# Patient Record
Sex: FEMALE | Race: WHITE | Employment: OTHER | URBAN - NONMETROPOLITAN AREA
[De-identification: names, ages, dates, MRNs, and addresses within clinical notes are randomized per-mention and may not be internally consistent; named-entity substitution may affect disease eponyms.]

---

## 2017-07-26 ENCOUNTER — HOSPITAL ENCOUNTER (OUTPATIENT)
Dept: WOMENS IMAGING | Age: 77
Discharge: HOME | End: 2017-07-26
Payer: MEDICARE

## 2017-07-26 DIAGNOSIS — N64.4 MASTODYNIA: ICD-10-CM

## 2017-07-26 DIAGNOSIS — N64.4 BREAST PAIN: ICD-10-CM

## 2017-07-26 PROCEDURE — 76642 ULTRASOUND BREAST LIMITED: CPT

## 2017-07-26 PROCEDURE — G0279 TOMOSYNTHESIS, MAMMO: HCPCS

## 2018-07-27 ENCOUNTER — HOSPITAL ENCOUNTER (OUTPATIENT)
Dept: WOMENS IMAGING | Age: 78
Discharge: HOME | End: 2018-07-27
Payer: MEDICARE

## 2018-07-27 DIAGNOSIS — Z12.31 VISIT FOR SCREENING MAMMOGRAM: ICD-10-CM

## 2018-07-27 PROCEDURE — 77067 SCR MAMMO BI INCL CAD: CPT

## 2018-12-06 ENCOUNTER — APPOINTMENT (OUTPATIENT)
Dept: GENERAL RADIOLOGY | Age: 78
End: 2018-12-06
Payer: MEDICARE

## 2018-12-06 ENCOUNTER — APPOINTMENT (OUTPATIENT)
Dept: CT IMAGING | Age: 78
End: 2018-12-06
Payer: MEDICARE

## 2018-12-06 ENCOUNTER — HOSPITAL ENCOUNTER (OUTPATIENT)
Age: 78
Setting detail: OBSERVATION
Discharge: HOME | End: 2018-12-07
Attending: EMERGENCY MEDICINE | Admitting: INTERNAL MEDICINE
Payer: MEDICARE

## 2018-12-06 DIAGNOSIS — I10 HYPERTENSION, UNSPECIFIED TYPE: ICD-10-CM

## 2018-12-06 DIAGNOSIS — R20.2 FACIAL TINGLING: Primary | ICD-10-CM

## 2018-12-06 DIAGNOSIS — G45.9 TIA (TRANSIENT ISCHEMIC ATTACK): ICD-10-CM

## 2018-12-06 PROBLEM — G43.109 MIGRAINE WITH TYPICAL AURA: Status: ACTIVE | Noted: 2018-12-06

## 2018-12-06 LAB
ALBUMIN SERPL-MCNC: 3.7 G/DL (ref 3.5–5.1)
ALP BLD-CCNC: 62 U/L (ref 38–126)
ALT SERPL-CCNC: 13 U/L (ref 11–66)
ANION GAP SERPL CALCULATED.3IONS-SCNC: 12 MEQ/L (ref 8–16)
APTT: 28.2 SECONDS (ref 22–38)
AST SERPL-CCNC: 16 U/L (ref 5–40)
BASOPHILS # BLD: 0.5 %
BASOPHILS ABSOLUTE: 0 THOU/MM3 (ref 0–0.1)
BILIRUB SERPL-MCNC: 0.3 MG/DL (ref 0.3–1.2)
BILIRUBIN DIRECT: < 0.2 MG/DL (ref 0–0.3)
BUN BLDV-MCNC: 22 MG/DL (ref 7–22)
CALCIUM SERPL-MCNC: 9.3 MG/DL (ref 8.5–10.5)
CHLORIDE BLD-SCNC: 102 MEQ/L (ref 98–111)
CO2: 24 MEQ/L (ref 23–33)
CREAT SERPL-MCNC: 1.1 MG/DL (ref 0.4–1.2)
EKG ATRIAL RATE: 92 BPM
EKG P AXIS: 53 DEGREES
EKG P-R INTERVAL: 222 MS
EKG Q-T INTERVAL: 382 MS
EKG QRS DURATION: 146 MS
EKG QTC CALCULATION (BAZETT): 472 MS
EKG R AXIS: -70 DEGREES
EKG T AXIS: 98 DEGREES
EKG VENTRICULAR RATE: 92 BPM
EOSINOPHIL # BLD: 2.2 %
EOSINOPHILS ABSOLUTE: 0.1 THOU/MM3 (ref 0–0.4)
ERYTHROCYTE [DISTWIDTH] IN BLOOD BY AUTOMATED COUNT: 12.7 % (ref 11.5–14.5)
ERYTHROCYTE [DISTWIDTH] IN BLOOD BY AUTOMATED COUNT: 41.5 FL (ref 35–45)
GFR SERPL CREATININE-BSD FRML MDRD: 48 ML/MIN/1.73M2
GLUCOSE BLD-MCNC: 124 MG/DL (ref 70–108)
GLUCOSE BLD-MCNC: 127 MG/DL (ref 70–108)
HCT VFR BLD CALC: 45.6 % (ref 37–47)
HEMOGLOBIN: 15.1 GM/DL (ref 12–16)
IMMATURE GRANS (ABS): 0.02 THOU/MM3 (ref 0–0.07)
IMMATURE GRANULOCYTES: 0.3 %
INR BLD: 0.9 (ref 0.85–1.13)
LIPASE: 30.5 U/L (ref 5.6–51.3)
LYMPHOCYTES # BLD: 16.2 %
LYMPHOCYTES ABSOLUTE: 1 THOU/MM3 (ref 1–4.8)
MAGNESIUM: 2.2 MG/DL (ref 1.6–2.4)
MCH RBC QN AUTO: 30 PG (ref 26–33)
MCHC RBC AUTO-ENTMCNC: 33.1 GM/DL (ref 32.2–35.5)
MCV RBC AUTO: 90.5 FL (ref 81–99)
MONOCYTES # BLD: 7.3 %
MONOCYTES ABSOLUTE: 0.5 THOU/MM3 (ref 0.4–1.3)
NUCLEATED RED BLOOD CELLS: 0 /100 WBC
OSMOLALITY CALCULATION: 280.4 MOSMOL/KG (ref 275–300)
PLATELET # BLD: 196 THOU/MM3 (ref 130–400)
PMV BLD AUTO: 10.6 FL (ref 9.4–12.4)
POTASSIUM SERPL-SCNC: 4.6 MEQ/L (ref 3.5–5.2)
RBC # BLD: 5.04 MILL/MM3 (ref 4.2–5.4)
SEG NEUTROPHILS: 73.5 %
SEGMENTED NEUTROPHILS ABSOLUTE COUNT: 4.6 THOU/MM3 (ref 1.8–7.7)
SODIUM BLD-SCNC: 138 MEQ/L (ref 135–145)
TOTAL PROTEIN: 6.9 G/DL (ref 6.1–8)
TROPONIN T: < 0.01 NG/ML
WBC # BLD: 6.3 THOU/MM3 (ref 4.8–10.8)

## 2018-12-06 PROCEDURE — 85610 PROTHROMBIN TIME: CPT

## 2018-12-06 PROCEDURE — 99220 PR INITIAL OBSERVATION CARE/DAY 70 MINUTES: CPT | Performed by: INTERNAL MEDICINE

## 2018-12-06 PROCEDURE — 71045 X-RAY EXAM CHEST 1 VIEW: CPT

## 2018-12-06 PROCEDURE — 85025 COMPLETE CBC W/AUTO DIFF WBC: CPT

## 2018-12-06 PROCEDURE — 83690 ASSAY OF LIPASE: CPT

## 2018-12-06 PROCEDURE — 83735 ASSAY OF MAGNESIUM: CPT

## 2018-12-06 PROCEDURE — 82948 REAGENT STRIP/BLOOD GLUCOSE: CPT

## 2018-12-06 PROCEDURE — 82248 BILIRUBIN DIRECT: CPT

## 2018-12-06 PROCEDURE — 80053 COMPREHEN METABOLIC PANEL: CPT

## 2018-12-06 PROCEDURE — 85730 THROMBOPLASTIN TIME PARTIAL: CPT

## 2018-12-06 PROCEDURE — 36415 COLL VENOUS BLD VENIPUNCTURE: CPT

## 2018-12-06 PROCEDURE — 99285 EMERGENCY DEPT VISIT HI MDM: CPT

## 2018-12-06 PROCEDURE — 6370000000 HC RX 637 (ALT 250 FOR IP): Performed by: NURSE PRACTITIONER

## 2018-12-06 PROCEDURE — 70450 CT HEAD/BRAIN W/O DYE: CPT

## 2018-12-06 PROCEDURE — 84484 ASSAY OF TROPONIN QUANT: CPT

## 2018-12-06 PROCEDURE — 93005 ELECTROCARDIOGRAM TRACING: CPT | Performed by: NURSE PRACTITIONER

## 2018-12-06 RX ORDER — CLONIDINE HYDROCHLORIDE 0.1 MG/1
0.1 TABLET ORAL ONCE
Status: COMPLETED | OUTPATIENT
Start: 2018-12-06 | End: 2018-12-06

## 2018-12-06 RX ORDER — CLOPIDOGREL 300 MG/1
300 TABLET, FILM COATED ORAL ONCE
Status: COMPLETED | OUTPATIENT
Start: 2018-12-06 | End: 2018-12-06

## 2018-12-06 RX ADMIN — CLOPIDOGREL BISULFATE 300 MG: 300 TABLET, FILM COATED ORAL at 22:32

## 2018-12-06 RX ADMIN — CLONIDINE HYDROCHLORIDE 0.1 MG: 0.1 TABLET ORAL at 22:32

## 2018-12-06 ASSESSMENT — ENCOUNTER SYMPTOMS: SHORTNESS OF BREATH: 0

## 2018-12-07 VITALS
HEART RATE: 67 BPM | RESPIRATION RATE: 16 BRPM | OXYGEN SATURATION: 93 % | DIASTOLIC BLOOD PRESSURE: 70 MMHG | SYSTOLIC BLOOD PRESSURE: 160 MMHG | HEIGHT: 63 IN | TEMPERATURE: 97.9 F | BODY MASS INDEX: 27.46 KG/M2 | WEIGHT: 155 LBS

## 2018-12-07 PROBLEM — G45.9 TIA (TRANSIENT ISCHEMIC ATTACK): Status: ACTIVE | Noted: 2018-12-07

## 2018-12-07 PROBLEM — G45.9 TIA (TRANSIENT ISCHEMIC ATTACK): Status: RESOLVED | Noted: 2018-12-07 | Resolved: 2018-12-07

## 2018-12-07 PROBLEM — G43.109 MIGRAINE WITH TYPICAL AURA: Status: RESOLVED | Noted: 2018-12-06 | Resolved: 2018-12-07

## 2018-12-07 LAB
ANION GAP SERPL CALCULATED.3IONS-SCNC: 11 MEQ/L (ref 8–16)
BUN BLDV-MCNC: 19 MG/DL (ref 7–22)
CALCIUM SERPL-MCNC: 9.3 MG/DL (ref 8.5–10.5)
CHLORIDE BLD-SCNC: 106 MEQ/L (ref 98–111)
CHOLESTEROL, TOTAL: 166 MG/DL (ref 100–199)
CO2: 24 MEQ/L (ref 23–33)
CREAT SERPL-MCNC: 1.1 MG/DL (ref 0.4–1.2)
ERYTHROCYTE [DISTWIDTH] IN BLOOD BY AUTOMATED COUNT: 12.5 % (ref 11.5–14.5)
ERYTHROCYTE [DISTWIDTH] IN BLOOD BY AUTOMATED COUNT: 43 FL (ref 35–45)
GFR SERPL CREATININE-BSD FRML MDRD: 48 ML/MIN/1.73M2
GLUCOSE BLD-MCNC: 109 MG/DL (ref 70–108)
HCT VFR BLD CALC: 44.3 % (ref 37–47)
HDLC SERPL-MCNC: 42 MG/DL
HEMOGLOBIN: 14.2 GM/DL (ref 12–16)
LDL CHOLESTEROL CALCULATED: 102 MG/DL
LV EF: 55 %
LVEF MODALITY: NORMAL
MCH RBC QN AUTO: 30 PG (ref 26–33)
MCHC RBC AUTO-ENTMCNC: 32.1 GM/DL (ref 32.2–35.5)
MCV RBC AUTO: 93.7 FL (ref 81–99)
OSMOLALITY CALCULATION: 284.1 MOSMOL/KG (ref 275–300)
PLATELET # BLD: 176 THOU/MM3 (ref 130–400)
PMV BLD AUTO: 10 FL (ref 9.4–12.4)
POTASSIUM SERPL-SCNC: 4.2 MEQ/L (ref 3.5–5.2)
RBC # BLD: 4.73 MILL/MM3 (ref 4.2–5.4)
SEDIMENTATION RATE, ERYTHROCYTE: 18 MM/HR (ref 0–20)
SODIUM BLD-SCNC: 141 MEQ/L (ref 135–145)
TRIGL SERPL-MCNC: 109 MG/DL (ref 0–199)
WBC # BLD: 6.4 THOU/MM3 (ref 4.8–10.8)

## 2018-12-07 PROCEDURE — G0378 HOSPITAL OBSERVATION PER HR: HCPCS

## 2018-12-07 PROCEDURE — 36415 COLL VENOUS BLD VENIPUNCTURE: CPT

## 2018-12-07 PROCEDURE — 6360000002 HC RX W HCPCS: Performed by: INTERNAL MEDICINE

## 2018-12-07 PROCEDURE — 80048 BASIC METABOLIC PNL TOTAL CA: CPT

## 2018-12-07 PROCEDURE — 80061 LIPID PANEL: CPT

## 2018-12-07 PROCEDURE — 93306 TTE W/DOPPLER COMPLETE: CPT

## 2018-12-07 PROCEDURE — 93010 ELECTROCARDIOGRAM REPORT: CPT | Performed by: NUCLEAR MEDICINE

## 2018-12-07 PROCEDURE — 85027 COMPLETE CBC AUTOMATED: CPT

## 2018-12-07 PROCEDURE — 85651 RBC SED RATE NONAUTOMATED: CPT

## 2018-12-07 PROCEDURE — 96374 THER/PROPH/DIAG INJ IV PUSH: CPT

## 2018-12-07 PROCEDURE — 6370000000 HC RX 637 (ALT 250 FOR IP): Performed by: INTERNAL MEDICINE

## 2018-12-07 PROCEDURE — 99217 PR OBSERVATION CARE DISCHARGE MANAGEMENT: CPT | Performed by: INTERNAL MEDICINE

## 2018-12-07 RX ORDER — ISOSORBIDE MONONITRATE 30 MG/1
30 TABLET, EXTENDED RELEASE ORAL DAILY
Status: DISCONTINUED | OUTPATIENT
Start: 2018-12-07 | End: 2018-12-07 | Stop reason: HOSPADM

## 2018-12-07 RX ORDER — NITROGLYCERIN 0.4 MG/1
0.4 TABLET SUBLINGUAL EVERY 5 MIN PRN
Status: DISCONTINUED | OUTPATIENT
Start: 2018-12-07 | End: 2018-12-07 | Stop reason: HOSPADM

## 2018-12-07 RX ORDER — ISOSORBIDE MONONITRATE 30 MG/1
30 TABLET, EXTENDED RELEASE ORAL DAILY
Qty: 30 TABLET | Refills: 3 | Status: ON HOLD | OUTPATIENT
Start: 2018-12-07 | End: 2019-02-24 | Stop reason: HOSPADM

## 2018-12-07 RX ORDER — HYDRALAZINE HYDROCHLORIDE 20 MG/ML
5 INJECTION INTRAMUSCULAR; INTRAVENOUS ONCE
Status: COMPLETED | OUTPATIENT
Start: 2018-12-07 | End: 2018-12-07

## 2018-12-07 RX ORDER — CLONIDINE HYDROCHLORIDE 0.1 MG/1
0.1 TABLET ORAL DAILY
Qty: 60 TABLET | Refills: 3 | Status: SHIPPED | OUTPATIENT
Start: 2018-12-07 | End: 2022-11-01 | Stop reason: SDUPTHER

## 2018-12-07 RX ORDER — CLONIDINE HYDROCHLORIDE 0.1 MG/1
0.1 TABLET ORAL NIGHTLY
Status: DISCONTINUED | OUTPATIENT
Start: 2018-12-07 | End: 2018-12-07 | Stop reason: HOSPADM

## 2018-12-07 RX ORDER — ASPIRIN 81 MG/1
81 TABLET ORAL DAILY
Status: DISCONTINUED | OUTPATIENT
Start: 2018-12-07 | End: 2018-12-07 | Stop reason: HOSPADM

## 2018-12-07 RX ORDER — METOPROLOL SUCCINATE 25 MG/1
25 TABLET, EXTENDED RELEASE ORAL DAILY
Status: DISCONTINUED | OUTPATIENT
Start: 2018-12-07 | End: 2018-12-07 | Stop reason: HOSPADM

## 2018-12-07 RX ORDER — LOSARTAN POTASSIUM 50 MG/1
50 TABLET ORAL DAILY
Status: DISCONTINUED | OUTPATIENT
Start: 2018-12-07 | End: 2018-12-07 | Stop reason: HOSPADM

## 2018-12-07 RX ORDER — ACETAMINOPHEN 325 MG/1
650 TABLET ORAL EVERY 6 HOURS PRN
Status: DISCONTINUED | OUTPATIENT
Start: 2018-12-07 | End: 2018-12-07 | Stop reason: HOSPADM

## 2018-12-07 RX ORDER — ASCORBIC ACID 500 MG
500 TABLET ORAL DAILY
Status: DISCONTINUED | OUTPATIENT
Start: 2018-12-07 | End: 2018-12-07 | Stop reason: HOSPADM

## 2018-12-07 RX ORDER — EZETIMIBE 10 MG/1
10 TABLET ORAL NIGHTLY
Status: DISCONTINUED | OUTPATIENT
Start: 2018-12-07 | End: 2018-12-07

## 2018-12-07 RX ADMIN — HYDRALAZINE HYDROCHLORIDE 5 MG: 20 INJECTION INTRAMUSCULAR; INTRAVENOUS at 03:48

## 2018-12-07 RX ADMIN — LOSARTAN POTASSIUM 50 MG: 50 TABLET, FILM COATED ORAL at 09:51

## 2018-12-07 RX ADMIN — METOPROLOL SUCCINATE 25 MG: 25 TABLET, EXTENDED RELEASE ORAL at 09:52

## 2018-12-07 RX ADMIN — ISOSORBIDE MONONITRATE 30 MG: 30 TABLET, EXTENDED RELEASE ORAL at 09:51

## 2018-12-07 RX ADMIN — ACETAMINOPHEN 650 MG: 325 TABLET ORAL at 07:52

## 2018-12-07 RX ADMIN — ASPIRIN 81 MG: 81 TABLET ORAL at 09:50

## 2018-12-07 ASSESSMENT — PAIN SCALES - GENERAL
PAINLEVEL_OUTOF10: 6
PAINLEVEL_OUTOF10: 0

## 2018-12-07 NOTE — ED NOTES
Pt states that she is no longer experiencing any symptoms, says that she no longer has a strange sensation on the left side of her face     Ania Franco RN  12/06/18 [id number]

## 2018-12-07 NOTE — H&P
History & Physical        Patient:  Xiomara Cha  YOB: 1940    MRN: [id number]     Acct: [de-identified]    PCP: Giovanny Rolon MD    Date of Admission: 12/6/2018    Date of Service: Pt seen/examined on 12/06/18  and Admitted to Inpatient with expected LOS less than two midnights due to medical therapy. Chief Complaint:  Left sided facial numbness    History Of Present Illness:   [de-identified] y.o. female who presented to [facility] with left sided facial numbness since 5 pm, patient also reports long standing hx of sinus headaches but does not remember when was the last time she was treated with nasonex of antibiotics. Patient reports some scalp headache over the left sided without visual changes. Denies fever, neck rigidity, nausea or vomiting, hx of vertigo, syncope, no fmhx of vasculitis, avms or aneurysms. Past Medical History:          Diagnosis Date    Arthritis     CAD (coronary artery disease)     Hyperlipidemia     Hypertension     Ovarian cyst     right       Past Surgical History:          Procedure Laterality Date    CARDIAC CATHETERIZATION  2007    CHOLECYSTECTOMY  1986    COLONOSCOPY      last one 2014   [address]    FOOT SURGERY Right 2004 & 2005    x2    FOOT SURGERY Left 6/24/2015    Osteotomy, Tendon Transfer    HYSTERECTOMY  1981    OVARY REMOVAL Bilateral 3/23/13    PACEMAKER PLACEMENT  2004    PARTIAL HYSTERECTOMY  1981    still has ovaries    SEPTOPLASTY  9/28/2011    Dr. Lawton Numbers      dr Vu Pierson [id number]    TONSILLECTOMY      as a child   Hoda Costa  9/28/2011    Dr. Jose Garsia       Medications Prior to Admission:      Prior to Admission medications    Medication Sig Start Date End Date Taking?  Authorizing Provider   Cholecalciferol (VITAMIN D3) 2000 UNITS CAPS Take 1 capsule by mouth daily   Yes Historical Provider, MD   cloNIDine (CATAPRES) 0.1 MG tablet Take 0.1 mg by mouth nightly   Yes Edyta Mckenzie on 12/6/2018 9:12 PM        DVT prophylaxis: [] Lovenox                                 [x] SCDs                                 [] SQ Heparin                                 [] Encourage ambulation           [] Already on Anticoagulation    Code Status: FULL CODE  Disposition:    [x] Home       [] TCU       [] Rehab       [] Psych       [] SNF       [] [place]       [] Other-    ASSESSMENT and PLAN:    1. Left sided facial numbness  2. Migraine with aura vs TIA  3. Telemetry, TTE.   4. Patient declined getting an MRI and or MRV multiple times to me  5. Patient not a candidate for Tpa: Age and uncontrolled BP  6. Patient declines using statins, and did not want revascularization surgery, much less thrombectomy for this reasons I will hold off further work up since it would not . 7. I will leave to am attending need for Neurology. 8. Continue home meds        Thank you Henry Abernathy MD for the opportunity to be involved in this patient's care.     Electronically signed by Natalia Martin MD on 12/6/2018 at 10:51 PM

## 2018-12-07 NOTE — PROGRESS NOTES
Dr. Jyoti Gamez stated okay to discharge pt at this time. Per physician, no need for neurology consult at this time. Pt updated.

## 2018-12-07 NOTE — PLAN OF CARE
Problem: Falls - Risk of:  Goal: Will remain free from falls  Will remain free from falls   Outcome: Ongoing  Bed in lowest position and locked. Bed alarm activated. Educated on use of call light when in need of assistance- expressed understanding. Visual checks performed and charted. No falls during shift at this time. Armband and falling star in place. Call light within reach. Transfers with one assist.      Problem: Discharge Planning:  Goal: Participates in care planning  Participates in care planning  Outcome: Not Met This Shift  Patient is from home alone. Plans to return home at discharge. Patient is observation at this time. Problem: Pain:  Goal: Pain level will decrease  Pain level will decrease  Outcome: Ongoing  Patient has denied pain so far this shift. Comments: Care plan reviewed with patient and sister. Patient and sister verbalize understanding of the plan of care and contribute to goal setting.
and contributes to goal setting.

## 2018-12-07 NOTE — ED NOTES
Pt symptoms remain the same, pt alert and oriented, denies any other symptoms, provider into room     Kerrie Boucher RN  12/06/18 2810

## 2018-12-08 NOTE — DISCHARGE SUMMARY
Hospital Medicine Discharge Summary      Patient Identification:   Patricia Esquivel   : 1940  MRN: [id number]   Account: [de-identified]      Patient's PCP: Krystyna Almazan MD    Admit Date: 2018     Discharge Date: 2018      Admitting Physician: Michelle Dubose MD     Discharge Physician: Wicho Lopez MD     Discharge Diagnoses:    1. TIA  2. Uncontrolled HTN     The patient was seen and examined on day of discharge and this discharge summary is in conjunction with any daily progress note from day of discharge. Hospital Course:   Patricia Esquivel is a [de-identified] y.o. female admitted to [facility] on 2018 for Rt sided facial numbness and uncontrolled HTN . Initial CT head in ER negative for acute pathology . Further imaging with MRI for possible TIA/CVA is refused by pt stating that she has pacemaker and dint allow the staff to check for comparability . She this morning has complete resolution of symptoms . She is placed back on her home regimen on antihypertensives and asa. BP medication doses adjusted for better BP control . She is being arranged with home nurse to educate and monitor compliance . All her other chronic medical conditions were also managed during the hospital stay . Pt agreeable to the discharge plan , All questions answered . She is being discharged in improved condition. Exam:     Vitals:  Vitals:    18 0530 18 0630 18 0931 18 1142   BP: (!) 188/86 (!) 164/74 (!) 190/82 (!) 160/70   Pulse:  95 84 67   Resp:   16 16   Temp:   98.5 °F (36.9 °C) 97.9 °F (36.6 °C)   TempSrc:   Oral Oral   SpO2:   97% 93%   Weight:       Height:         Weight: Weight: 155 lb (70.3 kg)     24 hour intake/output:No intake or output data in the 24 hours ending 18      General appearance:  No apparent distress, appears stated age and cooperative. HEENT:  Normal cephalic, atraumatic without obvious deformity.  Pupils equal, round, and PREDNISOLONE ACETATE OP  Apply 1 drop to eye daily BOTH EYES  RIGHT 2 TIME A DAYS; LEFT EYE Three TIMES A DAY                 Time Spent on discharge is more than 45 minutes in the examination, evaluation, counseling and review of medications and discharge plan. Signed: Thank you Emanuel Bishop MD for the opportunity to be involved in this patient's care.     Electronically signed by Siria Holt MD on 12/7/2018 at 7:49 PM

## 2018-12-20 ENCOUNTER — HOSPITAL ENCOUNTER (EMERGENCY)
Age: 78
Discharge: HOME | End: 2018-12-20
Attending: EMERGENCY MEDICINE
Payer: MEDICARE

## 2018-12-20 VITALS
DIASTOLIC BLOOD PRESSURE: 56 MMHG | HEIGHT: 62 IN | OXYGEN SATURATION: 96 % | SYSTOLIC BLOOD PRESSURE: 126 MMHG | WEIGHT: 157 LBS | RESPIRATION RATE: 16 BRPM | HEART RATE: 65 BPM | TEMPERATURE: 97.9 F | BODY MASS INDEX: 28.89 KG/M2

## 2018-12-20 DIAGNOSIS — I16.0 HYPERTENSIVE URGENCY: Primary | ICD-10-CM

## 2018-12-20 LAB
ANION GAP SERPL CALCULATED.3IONS-SCNC: 11 MEQ/L (ref 8–16)
BASOPHILS # BLD: 0.8 %
BASOPHILS ABSOLUTE: 0 THOU/MM3 (ref 0–0.1)
BUN BLDV-MCNC: 26 MG/DL (ref 7–22)
CALCIUM SERPL-MCNC: 9.2 MG/DL (ref 8.5–10.5)
CHLORIDE BLD-SCNC: 103 MEQ/L (ref 98–111)
CO2: 25 MEQ/L (ref 23–33)
CREAT SERPL-MCNC: 1 MG/DL (ref 0.4–1.2)
EKG ATRIAL RATE: 66 BPM
EKG ATRIAL RATE: 66 BPM
EKG P AXIS: 30 DEGREES
EKG P AXIS: 6 DEGREES
EKG P-R INTERVAL: 240 MS
EKG P-R INTERVAL: 240 MS
EKG Q-T INTERVAL: 422 MS
EKG Q-T INTERVAL: 426 MS
EKG QRS DURATION: 150 MS
EKG QRS DURATION: 152 MS
EKG QTC CALCULATION (BAZETT): 442 MS
EKG QTC CALCULATION (BAZETT): 446 MS
EKG R AXIS: -65 DEGREES
EKG R AXIS: -66 DEGREES
EKG T AXIS: 104 DEGREES
EKG T AXIS: 109 DEGREES
EKG VENTRICULAR RATE: 66 BPM
EKG VENTRICULAR RATE: 66 BPM
EOSINOPHIL # BLD: 4.2 %
EOSINOPHILS ABSOLUTE: 0.2 THOU/MM3 (ref 0–0.4)
ERYTHROCYTE [DISTWIDTH] IN BLOOD BY AUTOMATED COUNT: 12.8 % (ref 11.5–14.5)
ERYTHROCYTE [DISTWIDTH] IN BLOOD BY AUTOMATED COUNT: 42.2 FL (ref 35–45)
GFR SERPL CREATININE-BSD FRML MDRD: 53 ML/MIN/1.73M2
GLUCOSE BLD-MCNC: 118 MG/DL (ref 70–108)
HCT VFR BLD CALC: 38.8 % (ref 37–47)
HEMOGLOBIN: 12.8 GM/DL (ref 12–16)
IMMATURE GRANS (ABS): 0.02 THOU/MM3 (ref 0–0.07)
IMMATURE GRANULOCYTES: 0.4 %
LYMPHOCYTES # BLD: 28.7 %
LYMPHOCYTES ABSOLUTE: 1.5 THOU/MM3 (ref 1–4.8)
MCH RBC QN AUTO: 30 PG (ref 26–33)
MCHC RBC AUTO-ENTMCNC: 33 GM/DL (ref 32.2–35.5)
MCV RBC AUTO: 91.1 FL (ref 81–99)
MONOCYTES # BLD: 10 %
MONOCYTES ABSOLUTE: 0.5 THOU/MM3 (ref 0.4–1.3)
NUCLEATED RED BLOOD CELLS: 0 /100 WBC
OSMOLALITY CALCULATION: 283.4 MOSMOL/KG (ref 275–300)
PLATELET # BLD: 153 THOU/MM3 (ref 130–400)
PMV BLD AUTO: 10.3 FL (ref 9.4–12.4)
POTASSIUM SERPL-SCNC: 4.7 MEQ/L (ref 3.5–5.2)
RBC # BLD: 4.26 MILL/MM3 (ref 4.2–5.4)
SEG NEUTROPHILS: 55.9 %
SEGMENTED NEUTROPHILS ABSOLUTE COUNT: 3 THOU/MM3 (ref 1.8–7.7)
SODIUM BLD-SCNC: 139 MEQ/L (ref 135–145)
WBC # BLD: 5.3 THOU/MM3 (ref 4.8–10.8)

## 2018-12-20 PROCEDURE — 80048 BASIC METABOLIC PNL TOTAL CA: CPT

## 2018-12-20 PROCEDURE — 99283 EMERGENCY DEPT VISIT LOW MDM: CPT

## 2018-12-20 PROCEDURE — 36415 COLL VENOUS BLD VENIPUNCTURE: CPT

## 2018-12-20 PROCEDURE — 85025 COMPLETE CBC W/AUTO DIFF WBC: CPT

## 2018-12-20 PROCEDURE — 93005 ELECTROCARDIOGRAM TRACING: CPT | Performed by: EMERGENCY MEDICINE

## 2018-12-20 PROCEDURE — 6370000000 HC RX 637 (ALT 250 FOR IP): Performed by: EMERGENCY MEDICINE

## 2018-12-20 PROCEDURE — 93010 ELECTROCARDIOGRAM REPORT: CPT | Performed by: INTERNAL MEDICINE

## 2018-12-20 RX ORDER — CLONIDINE HYDROCHLORIDE 0.1 MG/1
0.1 TABLET ORAL ONCE
Status: COMPLETED | OUTPATIENT
Start: 2018-12-20 | End: 2018-12-20

## 2018-12-20 RX ADMIN — CLONIDINE HYDROCHLORIDE 0.1 MG: 0.1 TABLET ORAL at 04:57

## 2018-12-20 ASSESSMENT — ENCOUNTER SYMPTOMS
ABDOMINAL PAIN: 0
NAUSEA: 0
WHEEZING: 0
BLOOD IN STOOL: 0
VOMITING: 0
BACK PAIN: 0
SHORTNESS OF BREATH: 0
COUGH: 0
SORE THROAT: 0
DIARRHEA: 0

## 2018-12-20 NOTE — ED PROVIDER NOTES
[facility]     eMERGENCY dEPARTMENT eNCOUnter         Pt Name: Sabra Alvarado  MRN: [id number]  [place] 1940  Date of evaluation: 12/20/2018  Provider: Gamaliel Serrano MD    CHIEF COMPLAINT       Chief Complaint   Patient presents with    Hypertension    Tinnitus       Nurses Notes reviewed and I agree except as noted in the HPI. HISTORY OF PRESENT ILLNESS    Sabra Alvarado is a [de-identified] y.o. female who presents For evaluation of elevated blood pressure. The patient says she woke from sleep with tenderness which she identifies as her usual symptom with elevated blood pressure. The patient takes 0.1 Catapres every morning at 9:00 with her normal meds and took an additional 1 as instructed tonight for p.r.n. use. She denies headache chest pain nausea vomiting or diaphoresis. She said her blood pressure initially was 218/106 at around 2:45 AM.    This HPI was provided by the patient. REVIEW OF SYSTEMS     Review of Systems   Constitutional: Negative for chills, fever and unexpected weight change. HENT: Positive for tinnitus. Negative for congestion, ear pain and sore throat. Eyes: Negative for visual disturbance. Respiratory: Negative for cough, shortness of breath and wheezing. Cardiovascular: Negative for chest pain, palpitations and leg swelling. Gastrointestinal: Negative for abdominal pain, blood in stool, diarrhea, nausea and vomiting. Genitourinary: Negative for dysuria and hematuria. Musculoskeletal: Negative for arthralgias and back pain. Skin: Negative for rash. Allergic/Immunologic: Negative for environmental allergies. Neurological: Negative for dizziness, syncope, weakness, numbness and headaches. Hematological: Does not bruise/bleed easily. Psychiatric/Behavioral: Negative for dysphoric mood. The patient is not nervous/anxious. All other systems reviewed and are negative.     PAST MEDICAL HISTORY    has a past medical history of ciprofloxacin hcl; codeine; doxycycline; dyazide [hydrochlorothiazide w-triamterene]; fexofenadine; fluoxetine; hydralazine; hyoscyamine; indocin [indometacin sodium]; mometasone; morphine; norvasc [amlodipine besylate]; omnicef; plavix [clopidogrel bisulfate]; prednisone; promethazine; relafen [nabumetone]; statins depletion therapy; triamterene; amoxicillin; cipro xr; darvocet [propoxyphene n-acetaminophen]; diflucan [fluconazole]; dye [iodides]; hydrocodone; niacin and related; pcn [penicillins]; sulfa antibiotics; viroxyn [benzalkonium chloride-alcohol]; and zonalon [doxepin hcl]. FAMILY HISTORY     indicated that her mother is . She indicated that her father is . She indicated that the status of her sister is unknown. She indicated that the status of her brother is unknown. She indicated that the status of her paternal grandmother is unknown. She indicated that the status of her paternal grandfather is unknown. She indicated that the status of her maternal aunt is unknown. She indicated that the status of her other is unknown.    family history includes Allergies in an other family member; Arthritis in her father and paternal grandmother; Diabetes in her brother, father, maternal aunt, paternal grandfather, and another family member; Early Death (age of onset: 40) in her mother; Heart Disease in her paternal grandmother and sister; Heart Failure in an other family member; Hypertension in an other family member. SOCIAL HISTORY      reports that she has never smoked. She has never used smokeless tobacco. She reports that she does not drink alcohol or use drugs. PHYSICAL EXAM       ED Triage Vitals [18 0353]   BP Temp Temp Source Pulse Resp SpO2 Height Weight   (!) 182/73 97.9 °F (36.6 °C) Oral 65 17 96 % 5' 2\" (1.575 m) 157 lb (71.2 kg)      Physical Exam   Constitutional: She is oriented to person, place, and time. She appears well-developed and well-nourished. She is cooperative. Non-toxic appearance. She does not appear ill. HENT:   Head: Normocephalic. Right Ear: External ear normal. No drainage. Left Ear: External ear normal. No drainage. Nose: Nose normal. No epistaxis. Mouth/Throat: Mucous membranes are not dry and not cyanotic. Eyes: Conjunctivae and EOM are normal. Right eye exhibits no discharge. Left eye exhibits no discharge. No scleral icterus. Neck: Trachea normal, normal range of motion and phonation normal. Neck supple. No tracheal deviation present. Cardiovascular: Normal rate, regular rhythm, normal heart sounds and intact distal pulses. Exam reveals no gallop and no friction rub. No murmur heard. Pulses:       Radial pulses are 2+ on the right side. Pulmonary/Chest: Effort normal and breath sounds normal. No stridor. No respiratory distress. She has no wheezes. She has no rales. She exhibits no tenderness. Abdominal: Soft. Bowel sounds are normal. She exhibits no distension and no pulsatile midline mass. There is no tenderness. There is no rebound and no guarding. Musculoskeletal: Normal range of motion. She exhibits no edema or tenderness (No calf pain or tenderness. No evidence of DVT). Neurological: She is alert and oriented to person, place, and time. She has normal strength. She displays no tremor. She displays no seizure activity. Coordination normal. GCS eye subscore is 4. GCS verbal subscore is 5. GCS motor subscore is 6. Skin: Skin is warm and dry. No rash (on exposed surfaced) noted. She is not diaphoretic. No cyanosis or erythema. No pallor. Psychiatric: She has a normal mood and affect. Her speech is normal and behavior is normal.   Nursing note and vitals reviewed. DIFFERENTIAL DIAGNOSIS:   Hypertensive urgency. No evidence of any end organ compromise.     DIAGNOSTIC RESULTS     EKG: All EKG's are interpreted by the Emergency Department Physician who either signs or Co-signs this chart in the absence of a cardiologist.    EKG interpreted by me is a atrial and ventricular paced rhythm. Rate is 66 bpm.  UT interval is recorded at 240 ms. The QRS duration is 150 ms. Left axis deviation with R axis -65. No ST elevation MI and old EKG available for comparison.     LABS:   Results for orders placed or performed during the hospital encounter of 12/20/18   CBC auto differential   Result Value Ref Range    WBC 5.3 4.8 - 10.8 thou/mm3    RBC 4.26 4.20 - 5.40 mill/mm3    Hemoglobin 12.8 12.0 - 16.0 gm/dl    Hematocrit 38.8 37.0 - 47.0 %    MCV 91.1 81.0 - 99.0 fL    MCH 30.0 26.0 - 33.0 pg    MCHC 33.0 32.2 - 35.5 gm/dl    RDW-CV 12.8 11.5 - 14.5 %    RDW-SD 42.2 35.0 - 45.0 fL    Platelets 618 268 - 910 thou/mm3    MPV 10.3 9.4 - 12.4 fL    Seg Neutrophils 55.9 %    Lymphocytes 28.7 %    Monocytes 10.0 %    Eosinophils 4.2 %    Basophils 0.8 %    Immature Granulocytes 0.4 %    Segs Absolute 3.0 1.8 - 7.7 thou/mm3    Lymphocytes # 1.5 1.0 - 4.8 thou/mm3    Monocytes # 0.5 0.4 - 1.3 thou/mm3    Eosinophils # 0.2 0.0 - 0.4 thou/mm3    Basophils # 0.0 0.0 - 0.1 thou/mm3    Immature Grans (Abs) 0.02 0.00 - 0.07 thou/mm3    nRBC 0 /100 wbc   Basic Metabolic Panel   Result Value Ref Range    Sodium 139 135 - 145 meq/L    Potassium 4.7 3.5 - 5.2 meq/L    Chloride 103 98 - 111 meq/L    CO2 25 23 - 33 meq/L    Glucose 118 (H) 70 - 108 mg/dL    BUN 26 (H) 7 - 22 mg/dL    CREATININE 1.0 0.4 - 1.2 mg/dL    Calcium 9.2 8.5 - 10.5 mg/dL   Anion Gap   Result Value Ref Range    Anion Gap 11.0 8.0 - 16.0 meq/L   Glomerular Filtration Rate, Estimated   Result Value Ref Range    Est, Glom Filt Rate 53 (A) ml/min/1.73m2   Osmolality   Result Value Ref Range    Osmolality Calc 283.4 275.0 - 300 mOsmol/kg   EKG 12 Lead   Result Value Ref Range    Ventricular Rate 66 BPM    Atrial Rate 66 BPM    P-R Interval 240 ms    QRS Duration 152 ms    Q-T Interval 422 ms    QTc Calculation (Bazett) 442 ms    P Axis 6 degrees    R Axis -66 degrees    T Axis 109 degrees   EKG 12

## 2019-02-24 ENCOUNTER — HOSPITAL ENCOUNTER (OUTPATIENT)
Age: 79
Setting detail: OBSERVATION
Discharge: HOME | End: 2019-02-25
Attending: INTERNAL MEDICINE | Admitting: INTERNAL MEDICINE
Payer: MEDICARE

## 2019-02-24 ENCOUNTER — APPOINTMENT (OUTPATIENT)
Dept: GENERAL RADIOLOGY | Age: 79
End: 2019-02-24
Payer: MEDICARE

## 2019-02-24 DIAGNOSIS — I10 HYPERTENSION, UNSPECIFIED TYPE: ICD-10-CM

## 2019-02-24 DIAGNOSIS — R07.9 CHEST PAIN, UNSPECIFIED TYPE: Primary | ICD-10-CM

## 2019-02-24 LAB
ALBUMIN SERPL-MCNC: 3.8 G/DL (ref 3.5–5.1)
ALP BLD-CCNC: 61 U/L (ref 38–126)
ALT SERPL-CCNC: 12 U/L (ref 11–66)
ANION GAP SERPL CALCULATED.3IONS-SCNC: 13 MEQ/L (ref 8–16)
AST SERPL-CCNC: 15 U/L (ref 5–40)
BACTERIA: ABNORMAL /HPF
BASOPHILS # BLD: 0.6 %
BASOPHILS ABSOLUTE: 0 THOU/MM3 (ref 0–0.1)
BILIRUB SERPL-MCNC: 0.2 MG/DL (ref 0.3–1.2)
BILIRUBIN DIRECT: < 0.2 MG/DL (ref 0–0.3)
BILIRUBIN URINE: NEGATIVE
BLOOD, URINE: NEGATIVE
BUN BLDV-MCNC: 18 MG/DL (ref 7–22)
CALCIUM SERPL-MCNC: 9.2 MG/DL (ref 8.5–10.5)
CASTS 2: ABNORMAL /LPF
CASTS UA: ABNORMAL /LPF
CHARACTER, URINE: CLEAR
CHLORIDE BLD-SCNC: 103 MEQ/L (ref 98–111)
CO2: 22 MEQ/L (ref 23–33)
COLOR: YELLOW
CREAT SERPL-MCNC: 0.9 MG/DL (ref 0.4–1.2)
CRYSTALS, UA: ABNORMAL
EKG ATRIAL RATE: 68 BPM
EKG P AXIS: 79 DEGREES
EKG P-R INTERVAL: 260 MS
EKG Q-T INTERVAL: 426 MS
EKG QRS DURATION: 158 MS
EKG QTC CALCULATION (BAZETT): 452 MS
EKG R AXIS: -73 DEGREES
EKG T AXIS: 102 DEGREES
EKG VENTRICULAR RATE: 68 BPM
EOSINOPHIL # BLD: 3.8 %
EOSINOPHILS ABSOLUTE: 0.2 THOU/MM3 (ref 0–0.4)
EPITHELIAL CELLS, UA: ABNORMAL /HPF
ERYTHROCYTE [DISTWIDTH] IN BLOOD BY AUTOMATED COUNT: 12.6 % (ref 11.5–14.5)
ERYTHROCYTE [DISTWIDTH] IN BLOOD BY AUTOMATED COUNT: 41.5 FL (ref 35–45)
GFR SERPL CREATININE-BSD FRML MDRD: 60 ML/MIN/1.73M2
GLUCOSE BLD-MCNC: 110 MG/DL (ref 70–108)
GLUCOSE URINE: NEGATIVE MG/DL
HCT VFR BLD CALC: 42.4 % (ref 37–47)
HEMOGLOBIN: 14 GM/DL (ref 12–16)
IMMATURE GRANS (ABS): 0.01 THOU/MM3 (ref 0–0.07)
IMMATURE GRANULOCYTES: 0.2 %
KETONES, URINE: NEGATIVE
LEUKOCYTE ESTERASE, URINE: ABNORMAL
LIPASE: 37.2 U/L (ref 5.6–51.3)
LYMPHOCYTES # BLD: 28 %
LYMPHOCYTES ABSOLUTE: 1.4 THOU/MM3 (ref 1–4.8)
MAGNESIUM: 2.2 MG/DL (ref 1.6–2.4)
MCH RBC QN AUTO: 30 PG (ref 26–33)
MCHC RBC AUTO-ENTMCNC: 33 GM/DL (ref 32.2–35.5)
MCV RBC AUTO: 90.8 FL (ref 81–99)
MISCELLANEOUS 2: ABNORMAL
MONOCYTES # BLD: 7.2 %
MONOCYTES ABSOLUTE: 0.4 THOU/MM3 (ref 0.4–1.3)
NITRITE, URINE: NEGATIVE
NUCLEATED RED BLOOD CELLS: 0 /100 WBC
OSMOLALITY CALCULATION: 278.2 MOSMOL/KG (ref 275–300)
PH UA: 6
PLATELET # BLD: 173 THOU/MM3 (ref 130–400)
PMV BLD AUTO: 10.3 FL (ref 9.4–12.4)
POTASSIUM SERPL-SCNC: 3.6 MEQ/L (ref 3.5–5.2)
PROTEIN UA: NEGATIVE
RBC # BLD: 4.67 MILL/MM3 (ref 4.2–5.4)
RBC URINE: ABNORMAL /HPF
RENAL EPITHELIAL, UA: ABNORMAL
SEG NEUTROPHILS: 60.2 %
SEGMENTED NEUTROPHILS ABSOLUTE COUNT: 3 THOU/MM3 (ref 1.8–7.7)
SODIUM BLD-SCNC: 138 MEQ/L (ref 135–145)
SPECIFIC GRAVITY, URINE: < 1.005 (ref 1–1.03)
TOTAL PROTEIN: 7.1 G/DL (ref 6.1–8)
TROPONIN T: < 0.01 NG/ML
UROBILINOGEN, URINE: 0.2 EU/DL
WBC # BLD: 5 THOU/MM3 (ref 4.8–10.8)
WBC UA: ABNORMAL /HPF
YEAST: ABNORMAL

## 2019-02-24 PROCEDURE — 96374 THER/PROPH/DIAG INJ IV PUSH: CPT

## 2019-02-24 PROCEDURE — 71045 X-RAY EXAM CHEST 1 VIEW: CPT

## 2019-02-24 PROCEDURE — 93010 ELECTROCARDIOGRAM REPORT: CPT | Performed by: INTERNAL MEDICINE

## 2019-02-24 PROCEDURE — 84484 ASSAY OF TROPONIN QUANT: CPT

## 2019-02-24 PROCEDURE — 80076 HEPATIC FUNCTION PANEL: CPT

## 2019-02-24 PROCEDURE — G0378 HOSPITAL OBSERVATION PER HR: HCPCS

## 2019-02-24 PROCEDURE — 81001 URINALYSIS AUTO W/SCOPE: CPT

## 2019-02-24 PROCEDURE — 85025 COMPLETE CBC W/AUTO DIFF WBC: CPT

## 2019-02-24 PROCEDURE — 6370000000 HC RX 637 (ALT 250 FOR IP): Performed by: INTERNAL MEDICINE

## 2019-02-24 PROCEDURE — 93005 ELECTROCARDIOGRAM TRACING: CPT | Performed by: NURSE PRACTITIONER

## 2019-02-24 PROCEDURE — 83690 ASSAY OF LIPASE: CPT

## 2019-02-24 PROCEDURE — 83735 ASSAY OF MAGNESIUM: CPT

## 2019-02-24 PROCEDURE — 96376 TX/PRO/DX INJ SAME DRUG ADON: CPT

## 2019-02-24 PROCEDURE — 6370000000 HC RX 637 (ALT 250 FOR IP): Performed by: NURSE PRACTITIONER

## 2019-02-24 PROCEDURE — 36415 COLL VENOUS BLD VENIPUNCTURE: CPT

## 2019-02-24 PROCEDURE — 96365 THER/PROPH/DIAG IV INF INIT: CPT

## 2019-02-24 PROCEDURE — 2500000003 HC RX 250 WO HCPCS: Performed by: NURSE PRACTITIONER

## 2019-02-24 PROCEDURE — 80048 BASIC METABOLIC PNL TOTAL CA: CPT

## 2019-02-24 PROCEDURE — 99285 EMERGENCY DEPT VISIT HI MDM: CPT

## 2019-02-24 PROCEDURE — 2580000003 HC RX 258: Performed by: NURSE PRACTITIONER

## 2019-02-24 RX ORDER — METOPROLOL TARTRATE 50 MG/1
50 TABLET, FILM COATED ORAL 2 TIMES DAILY
Qty: 60 TABLET | Refills: 3 | Status: SHIPPED | OUTPATIENT
Start: 2019-02-24 | End: 2022-11-01 | Stop reason: SDUPTHER

## 2019-02-24 RX ORDER — SENNA PLUS 8.6 MG/1
1 TABLET ORAL DAILY PRN
Status: DISCONTINUED | OUTPATIENT
Start: 2019-02-24 | End: 2019-02-25 | Stop reason: HOSPADM

## 2019-02-24 RX ORDER — POTASSIUM CHLORIDE 20 MEQ/1
40 TABLET, EXTENDED RELEASE ORAL PRN
Status: DISCONTINUED | OUTPATIENT
Start: 2019-02-24 | End: 2019-02-25 | Stop reason: HOSPADM

## 2019-02-24 RX ORDER — ASPIRIN 81 MG/1
81 TABLET ORAL DAILY
Status: DISCONTINUED | OUTPATIENT
Start: 2019-02-24 | End: 2019-02-24 | Stop reason: SDUPTHER

## 2019-02-24 RX ORDER — CLONIDINE HYDROCHLORIDE 0.1 MG/1
0.1 TABLET ORAL ONCE
Status: COMPLETED | OUTPATIENT
Start: 2019-02-24 | End: 2019-02-24

## 2019-02-24 RX ORDER — LOSARTAN POTASSIUM 50 MG/1
50 TABLET ORAL DAILY
Status: DISCONTINUED | OUTPATIENT
Start: 2019-02-24 | End: 2019-02-24

## 2019-02-24 RX ORDER — SODIUM CHLORIDE 0.9 % (FLUSH) 0.9 %
10 SYRINGE (ML) INJECTION EVERY 12 HOURS SCHEDULED
Status: DISCONTINUED | OUTPATIENT
Start: 2019-02-24 | End: 2019-02-25 | Stop reason: HOSPADM

## 2019-02-24 RX ORDER — ISOSORBIDE MONONITRATE 60 MG/1
60 TABLET, EXTENDED RELEASE ORAL DAILY
Qty: 30 TABLET | Refills: 3 | Status: SHIPPED | OUTPATIENT
Start: 2019-02-24 | End: 2022-11-01 | Stop reason: SDUPTHER

## 2019-02-24 RX ORDER — ASPIRIN 81 MG/1
81 TABLET, CHEWABLE ORAL DAILY
Status: DISCONTINUED | OUTPATIENT
Start: 2019-02-25 | End: 2019-02-25 | Stop reason: HOSPADM

## 2019-02-24 RX ORDER — ASPIRIN 81 MG/1
324 TABLET, CHEWABLE ORAL ONCE
Status: COMPLETED | OUTPATIENT
Start: 2019-02-24 | End: 2019-02-24

## 2019-02-24 RX ORDER — LOSARTAN POTASSIUM 100 MG/1
100 TABLET ORAL DAILY
Status: DISCONTINUED | OUTPATIENT
Start: 2019-02-25 | End: 2019-02-25 | Stop reason: HOSPADM

## 2019-02-24 RX ORDER — NITROGLYCERIN 0.4 MG/1
0.4 TABLET SUBLINGUAL EVERY 5 MIN PRN
Status: DISCONTINUED | OUTPATIENT
Start: 2019-02-24 | End: 2019-02-25 | Stop reason: HOSPADM

## 2019-02-24 RX ORDER — NITROGLYCERIN 0.4 MG/1
0.4 TABLET SUBLINGUAL EVERY 5 MIN PRN
Status: DISCONTINUED | OUTPATIENT
Start: 2019-02-24 | End: 2019-02-24 | Stop reason: SDUPTHER

## 2019-02-24 RX ORDER — CLONIDINE HYDROCHLORIDE 0.1 MG/1
0.1 TABLET ORAL DAILY
Status: DISCONTINUED | OUTPATIENT
Start: 2019-02-24 | End: 2019-02-25 | Stop reason: HOSPADM

## 2019-02-24 RX ORDER — SODIUM CHLORIDE 0.9 % (FLUSH) 0.9 %
10 SYRINGE (ML) INJECTION PRN
Status: DISCONTINUED | OUTPATIENT
Start: 2019-02-24 | End: 2019-02-25 | Stop reason: HOSPADM

## 2019-02-24 RX ORDER — LOSARTAN POTASSIUM 100 MG/1
100 TABLET ORAL DAILY
Qty: 30 TABLET | Refills: 3 | Status: SHIPPED | OUTPATIENT
Start: 2019-02-24 | End: 2022-11-01 | Stop reason: SDUPTHER

## 2019-02-24 RX ORDER — ASCORBIC ACID 500 MG
500 TABLET ORAL DAILY
Status: DISCONTINUED | OUTPATIENT
Start: 2019-02-24 | End: 2019-02-25 | Stop reason: HOSPADM

## 2019-02-24 RX ORDER — EZETIMIBE 10 MG/1
10 TABLET ORAL NIGHTLY
Status: DISCONTINUED | OUTPATIENT
Start: 2019-02-24 | End: 2019-02-24 | Stop reason: SDUPTHER

## 2019-02-24 RX ORDER — ONDANSETRON 2 MG/ML
4 INJECTION INTRAMUSCULAR; INTRAVENOUS EVERY 6 HOURS PRN
Status: DISCONTINUED | OUTPATIENT
Start: 2019-02-24 | End: 2019-02-25 | Stop reason: HOSPADM

## 2019-02-24 RX ORDER — SODIUM CHLORIDE 9 MG/ML
INJECTION, SOLUTION INTRAVENOUS CONTINUOUS
Status: DISCONTINUED | OUTPATIENT
Start: 2019-02-24 | End: 2019-02-25 | Stop reason: HOSPADM

## 2019-02-24 RX ORDER — ACETAMINOPHEN 325 MG/1
650 TABLET ORAL EVERY 4 HOURS PRN
Status: DISCONTINUED | OUTPATIENT
Start: 2019-02-24 | End: 2019-02-25 | Stop reason: HOSPADM

## 2019-02-24 RX ORDER — NITROGLYCERIN 20 MG/100ML
5 INJECTION INTRAVENOUS CONTINUOUS
Status: DISCONTINUED | OUTPATIENT
Start: 2019-02-24 | End: 2019-02-24

## 2019-02-24 RX ORDER — AMLODIPINE BESYLATE 5 MG/1
5 TABLET ORAL DAILY
Status: DISCONTINUED | OUTPATIENT
Start: 2019-02-24 | End: 2019-02-24

## 2019-02-24 RX ORDER — ISOSORBIDE MONONITRATE 30 MG/1
30 TABLET, EXTENDED RELEASE ORAL DAILY
Status: DISCONTINUED | OUTPATIENT
Start: 2019-02-24 | End: 2019-02-24

## 2019-02-24 RX ORDER — AMLODIPINE BESYLATE 5 MG/1
5 TABLET ORAL DAILY
Qty: 30 TABLET | Refills: 3 | Status: SHIPPED | OUTPATIENT
Start: 2019-02-24 | End: 2022-11-01 | Stop reason: SINTOL

## 2019-02-24 RX ORDER — POTASSIUM CHLORIDE 7.45 MG/ML
10 INJECTION INTRAVENOUS PRN
Status: DISCONTINUED | OUTPATIENT
Start: 2019-02-24 | End: 2019-02-25 | Stop reason: HOSPADM

## 2019-02-24 RX ORDER — ISOSORBIDE MONONITRATE 60 MG/1
60 TABLET, EXTENDED RELEASE ORAL DAILY
Status: DISCONTINUED | OUTPATIENT
Start: 2019-02-25 | End: 2019-02-25 | Stop reason: HOSPADM

## 2019-02-24 RX ORDER — LANOLIN ALCOHOL/MO/W.PET/CERES
500 CREAM (GRAM) TOPICAL DAILY
Status: DISCONTINUED | OUTPATIENT
Start: 2019-02-24 | End: 2019-02-25 | Stop reason: HOSPADM

## 2019-02-24 RX ADMIN — Medication 500 MCG: at 16:30

## 2019-02-24 RX ADMIN — LOSARTAN POTASSIUM 50 MG: 50 TABLET ORAL at 08:40

## 2019-02-24 RX ADMIN — METOPROLOL TARTRATE 25 MG: 25 TABLET ORAL at 16:29

## 2019-02-24 RX ADMIN — CLONIDINE HYDROCHLORIDE 0.1 MG: 0.1 TABLET ORAL at 02:48

## 2019-02-24 RX ADMIN — Medication 500 MG: at 08:46

## 2019-02-24 RX ADMIN — ACETAMINOPHEN 650 MG: 325 TABLET ORAL at 16:29

## 2019-02-24 RX ADMIN — CLONIDINE HYDROCHLORIDE 0.1 MG: 0.1 TABLET ORAL at 08:41

## 2019-02-24 RX ADMIN — METOPROLOL TARTRATE 50 MG: 25 TABLET, FILM COATED ORAL at 21:25

## 2019-02-24 RX ADMIN — VITAMIN D, TAB 1000IU (100/BT) 2000 UNITS: 25 TAB at 08:46

## 2019-02-24 RX ADMIN — ASPIRIN 81 MG 81 MG: 81 TABLET ORAL at 01:23

## 2019-02-24 RX ADMIN — SODIUM CHLORIDE: 9 INJECTION, SOLUTION INTRAVENOUS at 01:35

## 2019-02-24 RX ADMIN — NITROGLYCERIN 5 MCG/MIN: 20 INJECTION INTRAVENOUS at 01:55

## 2019-02-24 RX ADMIN — ISOSORBIDE MONONITRATE 30 MG: 30 TABLET, EXTENDED RELEASE ORAL at 08:41

## 2019-02-24 RX ADMIN — NITROGLYCERIN 0.4 MG: 0.4 TABLET, ORALLY DISINTEGRATING SUBLINGUAL at 01:29

## 2019-02-24 ASSESSMENT — PAIN SCALES - GENERAL
PAINLEVEL_OUTOF10: 0
PAINLEVEL_OUTOF10: 3
PAINLEVEL_OUTOF10: 0
PAINLEVEL_OUTOF10: 3
PAINLEVEL_OUTOF10: 0

## 2019-02-24 ASSESSMENT — PAIN DESCRIPTION - LOCATION: LOCATION: BACK

## 2019-02-24 ASSESSMENT — ENCOUNTER SYMPTOMS
RHINORRHEA: 0
EYE DISCHARGE: 0
BACK PAIN: 0
DIARRHEA: 0
COUGH: 0
NAUSEA: 0
SHORTNESS OF BREATH: 0
ABDOMINAL PAIN: 0
VOMITING: 0
EYE PAIN: 0
SORE THROAT: 0
WHEEZING: 0

## 2019-02-24 ASSESSMENT — PAIN DESCRIPTION - DESCRIPTORS: DESCRIPTORS: ACHING;SHARP

## 2019-02-24 ASSESSMENT — PAIN DESCRIPTION - ORIENTATION: ORIENTATION: MID;UPPER

## 2019-02-24 ASSESSMENT — PAIN DESCRIPTION - FREQUENCY: FREQUENCY: CONTINUOUS

## 2019-02-24 ASSESSMENT — PAIN DESCRIPTION - PAIN TYPE: TYPE: ACUTE PAIN

## 2019-02-25 VITALS
BODY MASS INDEX: 28.33 KG/M2 | RESPIRATION RATE: 18 BRPM | SYSTOLIC BLOOD PRESSURE: 171 MMHG | HEART RATE: 60 BPM | WEIGHT: 159.9 LBS | DIASTOLIC BLOOD PRESSURE: 81 MMHG | TEMPERATURE: 97.7 F | OXYGEN SATURATION: 94 % | HEIGHT: 63 IN

## 2019-02-25 LAB
ALBUMIN SERPL-MCNC: 3.3 G/DL (ref 3.5–5.1)
ALP BLD-CCNC: 51 U/L (ref 38–126)
ALT SERPL-CCNC: 9 U/L (ref 11–66)
AST SERPL-CCNC: 10 U/L (ref 5–40)
BILIRUB SERPL-MCNC: 0.4 MG/DL (ref 0.3–1.2)
BILIRUBIN DIRECT: < 0.2 MG/DL (ref 0–0.3)
CHOLESTEROL, TOTAL: 138 MG/DL (ref 100–199)
EKG ATRIAL RATE: 61 BPM
EKG P-R INTERVAL: 236 MS
EKG Q-T INTERVAL: 426 MS
EKG QRS DURATION: 154 MS
EKG QTC CALCULATION (BAZETT): 428 MS
EKG R AXIS: -70 DEGREES
EKG T AXIS: 103 DEGREES
EKG VENTRICULAR RATE: 61 BPM
ERYTHROCYTE [DISTWIDTH] IN BLOOD BY AUTOMATED COUNT: 12.7 % (ref 11.5–14.5)
ERYTHROCYTE [DISTWIDTH] IN BLOOD BY AUTOMATED COUNT: 41.6 FL (ref 35–45)
HCT VFR BLD CALC: 38.6 % (ref 37–47)
HDLC SERPL-MCNC: 37 MG/DL
HEMOGLOBIN: 12.8 GM/DL (ref 12–16)
LDL CHOLESTEROL CALCULATED: 75 MG/DL
MCH RBC QN AUTO: 29.8 PG (ref 26–33)
MCHC RBC AUTO-ENTMCNC: 33.2 GM/DL (ref 32.2–35.5)
MCV RBC AUTO: 89.8 FL (ref 81–99)
PLATELET # BLD: 150 THOU/MM3 (ref 130–400)
PMV BLD AUTO: 9.8 FL (ref 9.4–12.4)
RBC # BLD: 4.3 MILL/MM3 (ref 4.2–5.4)
TOTAL PROTEIN: 6 G/DL (ref 6.1–8)
TRIGL SERPL-MCNC: 132 MG/DL (ref 0–199)
WBC # BLD: 5 THOU/MM3 (ref 4.8–10.8)

## 2019-02-25 PROCEDURE — 80076 HEPATIC FUNCTION PANEL: CPT

## 2019-02-25 PROCEDURE — 2580000003 HC RX 258: Performed by: INTERNAL MEDICINE

## 2019-02-25 PROCEDURE — 93010 ELECTROCARDIOGRAM REPORT: CPT | Performed by: NUCLEAR MEDICINE

## 2019-02-25 PROCEDURE — 80061 LIPID PANEL: CPT

## 2019-02-25 PROCEDURE — 2709999900 HC NON-CHARGEABLE SUPPLY

## 2019-02-25 PROCEDURE — 36415 COLL VENOUS BLD VENIPUNCTURE: CPT

## 2019-02-25 PROCEDURE — 6370000000 HC RX 637 (ALT 250 FOR IP): Performed by: INTERNAL MEDICINE

## 2019-02-25 PROCEDURE — 93005 ELECTROCARDIOGRAM TRACING: CPT | Performed by: INTERNAL MEDICINE

## 2019-02-25 PROCEDURE — G0378 HOSPITAL OBSERVATION PER HR: HCPCS

## 2019-02-25 PROCEDURE — 85027 COMPLETE CBC AUTOMATED: CPT

## 2019-02-25 RX ORDER — SPIRONOLACTONE 25 MG/1
50 TABLET ORAL DAILY
Qty: 30 TABLET | Refills: 3 | Status: SHIPPED | OUTPATIENT
Start: 2019-02-25 | End: 2022-11-01 | Stop reason: SDUPTHER

## 2019-02-25 RX ORDER — SPIRONOLACTONE 25 MG/1
25 TABLET ORAL ONCE
Status: COMPLETED | OUTPATIENT
Start: 2019-02-25 | End: 2019-02-25

## 2019-02-25 RX ORDER — SPIRONOLACTONE 25 MG/1
25 TABLET ORAL DAILY
Status: DISCONTINUED | OUTPATIENT
Start: 2019-02-25 | End: 2019-02-25 | Stop reason: HOSPADM

## 2019-02-25 RX ORDER — CLONIDINE HYDROCHLORIDE 0.1 MG/1
0.1 TABLET ORAL 2 TIMES DAILY
Qty: 60 TABLET | Refills: 3 | Status: SHIPPED | OUTPATIENT
Start: 2019-02-25 | End: 2022-11-01 | Stop reason: SDUPTHER

## 2019-02-25 RX ADMIN — ACETAMINOPHEN 650 MG: 325 TABLET ORAL at 01:06

## 2019-02-25 RX ADMIN — VITAMIN D, TAB 1000IU (100/BT) 2000 UNITS: 25 TAB at 08:27

## 2019-02-25 RX ADMIN — ISOSORBIDE MONONITRATE 60 MG: 60 TABLET, EXTENDED RELEASE ORAL at 08:23

## 2019-02-25 RX ADMIN — CLONIDINE HYDROCHLORIDE 0.1 MG: 0.1 TABLET ORAL at 08:23

## 2019-02-25 RX ADMIN — ASPIRIN 81 MG: 81 TABLET, CHEWABLE ORAL at 08:22

## 2019-02-25 RX ADMIN — Medication 500 MG: at 08:27

## 2019-02-25 RX ADMIN — SPIRONOLACTONE 25 MG: 25 TABLET ORAL at 10:12

## 2019-02-25 RX ADMIN — SPIRONOLACTONE 25 MG: 25 TABLET ORAL at 08:26

## 2019-02-25 RX ADMIN — LOSARTAN POTASSIUM 100 MG: 100 TABLET, FILM COATED ORAL at 08:25

## 2019-02-25 RX ADMIN — Medication 10 ML: at 08:31

## 2019-02-25 RX ADMIN — METOPROLOL TARTRATE 50 MG: 25 TABLET, FILM COATED ORAL at 08:26

## 2019-02-25 RX ADMIN — Medication 500 MCG: at 08:27

## 2019-02-25 ASSESSMENT — PAIN SCALES - GENERAL
PAINLEVEL_OUTOF10: 0

## 2019-08-27 ENCOUNTER — HOSPITAL ENCOUNTER (OUTPATIENT)
Dept: WOMENS IMAGING | Age: 79
Discharge: HOME | End: 2019-08-27
Payer: MEDICARE

## 2019-08-27 DIAGNOSIS — Z13.9 VISIT FOR SCREENING: ICD-10-CM

## 2019-08-27 PROCEDURE — 77063 BREAST TOMOSYNTHESIS BI: CPT

## 2020-08-27 ENCOUNTER — HOSPITAL ENCOUNTER (OUTPATIENT)
Dept: MAMMOGRAPHY | Age: 80
Discharge: HOME | End: 2020-08-27
Payer: MEDICARE

## 2020-08-27 PROCEDURE — 77063 BREAST TOMOSYNTHESIS BI: CPT

## 2020-11-04 NOTE — ED PROVIDER NOTES
plavix [clopidogrel bisulfate]; prednisone; promethazine; relafen [nabumetone]; statins depletion therapy; triamterene; amoxicillin; cipro xr; darvocet [propoxyphene n-acetaminophen]; diflucan [fluconazole]; dye [iodides]; hydrocodone; niacin and related; pcn [penicillins]; sulfa antibiotics; viroxyn [benzalkonium chloride-alcohol]; and zonalon [doxepin hcl]. FAMILY HISTORY     indicated that her mother is . She indicated that her father is . She indicated that the status of her sister is unknown. She indicated that the status of her brother is unknown. She indicated that the status of her paternal grandmother is unknown. She indicated that the status of her paternal grandfather is unknown. She indicated that the status of her maternal aunt is unknown. She indicated that the status of her other is unknown.    family history includes Allergies in an other family member; Arthritis in her father and paternal grandmother; Diabetes in her brother, father, maternal aunt, paternal grandfather, and another family member; Early Death (age of onset: 40) in her mother; Heart Disease in her paternal grandmother and sister; Heart Failure in an other family member; Hypertension in an other family member. SOCIAL HISTORY      reports that she has never smoked. She has never used smokeless tobacco. She reports that she does not drink alcohol or use drugs. PHYSICAL EXAM     INITIAL VITALS:  height is 5' 3\" (1.6 m) and weight is 155 lb (70.3 kg). Her oral temperature is 97.7 °F (36.5 °C). Her blood pressure is 188/86 (abnormal) and her pulse is 79. Her respiration is 16 and oxygen saturation is 95%. Physical Exam   Constitutional: She is oriented to person, place, and time. HENT:   Head: Normocephalic and atraumatic. Nose: Mucosal edema present. Right sinus exhibits maxillary sinus tenderness. Left sinus exhibits maxillary sinus tenderness. Eyes: Pupils are equal, round, and reactive to light. weakness, and headache. Patient does not have a history of stroke, nor a family history. Daughter was at beside and reported a last known well of 5:30 pm this evening. Patient has a history of hypertension and had not taken her evening medications prior to arrival. Initial blood pressure reading in the department was 181/108. Clonidine was administered. During the physical exam I noted obvious left facial droop and left arm was much weaker than right. Patient reported a history of sinus headaches and could not differentiate this headache from her typical sinus headaches. Maxillary sinus tenderness was noted on exam. Patient was taken to CT scan prior to full exam. I ordered appropriate labs and consulted with Dr. Promise Hurtado on further treatment. Laboratory and imaging results were reviewed and discussed with the patient and her daughter. Within the department, the patient was treated with Plavix after consulting with Dr. Promise Hurtado. Dr. Leldon Paget was consulted for admission. Strict return precautions and follow up instructions were discussed with the patient with which the patient agrees     Physical assessment findings, diagnostic testing(s) if applicable, and vital signs reviewed with patient/patient representative. Questions answered. Patient/patient representative is aware of care plan, questions answered, verbalizes understanding and is in agreement.      ED Medications administered this visit:   Medications   vitamin C (ASCORBIC ACID) tablet 500 mg (not administered)   aspirin EC tablet 81 mg (not administered)   cloNIDine (CATAPRES) tablet 0.1 mg (0.1 mg Oral Not Given 12/7/18 0045)   isosorbide mononitrate (IMDUR) extended release tablet 30 mg (not administered)   losartan (COZAAR) tablet 50 mg (not administered)   metoprolol succinate (TOPROL XL) extended release tablet 25 mg (not administered)   nitroGLYCERIN (NITROSTAT) SL tablet 0.4 mg (not administered)   clopidogrel (PLAVIX) tablet 300 mg (300 mg Oral Given 12/6/18 2232)   cloNIDine (CATAPRES) tablet 0.1 mg (0.1 mg Oral Given 12/6/18 2232)   hydrALAZINE (APRESOLINE) injection 5 mg (5 mg Intravenous Given 12/7/18 0348)       Patient was seen independently by myself. The Patient's final impression and disposition and plan was determined by myself. CRITICAL CARE:   None     CONSULTS:  Dr. Diaz Stain  Dr. Devyn Fontaine:  None    FINAL IMPRESSION      1. Facial tingling    2. Hypertension, unspecified type    3. TIA (transient ischemic attack)          DISPOSITION/PLAN   Admission         PATIENT REFERRED TO:  Morales Granados MD  [address] [place], [place] [phone]            DISCHARGE MEDICATIONS:  Current Discharge Medication List          (Please note that portions of this note were completed with a voice recognition program.  Efforts were made to edit thedictations but occasionally words are mis-transcribed.)    Scribe:  Pina Garcia 12/6/18 9:26 PM Scribing for and in the presence of Emile Velarde CNP. Provider:  I personally performed the services described in the documentation, reviewed and edited the documentation which was dictated to the scribe in my presence, and it accurately records my words and actions.     Emile Velarde CNP 12/6/18 5:46 AM      CELINA Arboleda CNP, APRN - CNP  12/07/18 [id number] all other ROS negative except as per HPI

## 2021-01-21 ENCOUNTER — IMMUNIZATION (OUTPATIENT)
Dept: PRIMARY CARE CLINIC | Age: 81
End: 2021-01-21
Payer: MEDICARE

## 2021-01-21 PROCEDURE — 0011A PR IMM ADMN SARSCOV2 100 MCG/0.5 ML 1ST DOSE: CPT | Performed by: FAMILY MEDICINE

## 2021-01-21 PROCEDURE — 91301 COVID-19, MODERNA VACCINE 100MCG/0.5ML DOSE: CPT | Performed by: FAMILY MEDICINE

## 2021-02-18 ENCOUNTER — IMMUNIZATION (OUTPATIENT)
Dept: PRIMARY CARE CLINIC | Age: 81
End: 2021-02-18
Payer: MEDICARE

## 2021-02-18 PROCEDURE — 0012A COVID-19, MODERNA VACCINE 100MCG/0.5ML DOSE: CPT | Performed by: FAMILY MEDICINE

## 2021-02-18 PROCEDURE — 91301 COVID-19, MODERNA VACCINE 100MCG/0.5ML DOSE: CPT | Performed by: FAMILY MEDICINE

## 2021-03-06 ENCOUNTER — HOSPITAL ENCOUNTER (OUTPATIENT)
Age: 81
Discharge: HOME | End: 2021-03-06
Payer: MEDICARE

## 2021-03-06 LAB
ALBUMIN SERPL-MCNC: 4 G/DL (ref 3.5–5.1)
ALP BLD-CCNC: 52 U/L (ref 38–126)
ALT SERPL-CCNC: 20 U/L (ref 11–66)
ANION GAP SERPL CALCULATED.3IONS-SCNC: 7 MEQ/L (ref 8–16)
AST SERPL-CCNC: 19 U/L (ref 5–40)
BILIRUB SERPL-MCNC: 0.5 MG/DL (ref 0.3–1.2)
BILIRUBIN DIRECT: < 0.2 MG/DL (ref 0–0.3)
BUN BLDV-MCNC: 28 MG/DL (ref 7–22)
CALCIUM SERPL-MCNC: 10 MG/DL (ref 8.5–10.5)
CHLORIDE BLD-SCNC: 105 MEQ/L (ref 98–111)
CHOLESTEROL, TOTAL: 188 MG/DL (ref 100–199)
CO2: 28 MEQ/L (ref 23–33)
CREAT SERPL-MCNC: 1.4 MG/DL (ref 0.4–1.2)
ERYTHROCYTE [DISTWIDTH] IN BLOOD BY AUTOMATED COUNT: 12.6 % (ref 11.5–14.5)
ERYTHROCYTE [DISTWIDTH] IN BLOOD BY AUTOMATED COUNT: 43.2 FL (ref 35–45)
GFR SERPL CREATININE-BSD FRML MDRD: 36 ML/MIN/1.73M2
GLUCOSE BLD-MCNC: 101 MG/DL (ref 70–108)
HCT VFR BLD CALC: 43.4 % (ref 37–47)
HDLC SERPL-MCNC: 42 MG/DL
HEMOGLOBIN: 14.1 GM/DL (ref 12–16)
LDL CHOLESTEROL CALCULATED: 108 MG/DL
MCH RBC QN AUTO: 30.5 PG (ref 26–33)
MCHC RBC AUTO-ENTMCNC: 32.5 GM/DL (ref 32.2–35.5)
MCV RBC AUTO: 93.9 FL (ref 81–99)
PLATELET # BLD: 182 THOU/MM3 (ref 130–400)
PMV BLD AUTO: 9.5 FL (ref 9.4–12.4)
POTASSIUM SERPL-SCNC: 4.9 MEQ/L (ref 3.5–5.2)
RBC # BLD: 4.62 MILL/MM3 (ref 4.2–5.4)
SODIUM BLD-SCNC: 140 MEQ/L (ref 135–145)
TOTAL PROTEIN: 7.6 G/DL (ref 6.1–8)
TRIGL SERPL-MCNC: 189 MG/DL (ref 0–199)
WBC # BLD: 6.1 THOU/MM3 (ref 4.8–10.8)

## 2021-03-06 PROCEDURE — 36415 COLL VENOUS BLD VENIPUNCTURE: CPT

## 2021-03-06 PROCEDURE — 82248 BILIRUBIN DIRECT: CPT

## 2021-03-06 PROCEDURE — 80061 LIPID PANEL: CPT

## 2021-03-06 PROCEDURE — 80053 COMPREHEN METABOLIC PANEL: CPT

## 2021-03-06 PROCEDURE — 85027 COMPLETE CBC AUTOMATED: CPT

## 2021-09-07 ENCOUNTER — HOSPITAL ENCOUNTER (OUTPATIENT)
Dept: MAMMOGRAPHY | Age: 81
Discharge: HOME | End: 2021-09-07
Payer: MEDICARE

## 2021-09-07 DIAGNOSIS — Z12.39 ENCOUNTER FOR OTHER SCREENING FOR MALIGNANT NEOPLASM OF BREAST: ICD-10-CM

## 2021-09-07 PROCEDURE — 77063 BREAST TOMOSYNTHESIS BI: CPT

## 2021-10-22 ENCOUNTER — NURSE ONLY (OUTPATIENT)
Dept: LAB | Age: 81
End: 2021-10-22

## 2021-10-22 LAB
ANION GAP SERPL CALCULATED.3IONS-SCNC: 11 MEQ/L (ref 8–16)
BUN BLDV-MCNC: 16 MG/DL (ref 7–22)
CALCIUM SERPL-MCNC: 9.6 MG/DL (ref 8.5–10.5)
CHLORIDE BLD-SCNC: 102 MEQ/L (ref 98–111)
CO2: 24 MEQ/L (ref 23–33)
CREAT SERPL-MCNC: 1.4 MG/DL (ref 0.4–1.2)
ERYTHROCYTE [DISTWIDTH] IN BLOOD BY AUTOMATED COUNT: 12.2 % (ref 11.5–14.5)
ERYTHROCYTE [DISTWIDTH] IN BLOOD BY AUTOMATED COUNT: 42.5 FL (ref 35–45)
GFR SERPL CREATININE-BSD FRML MDRD: 36 ML/MIN/1.73M2
GLUCOSE BLD-MCNC: 124 MG/DL (ref 70–108)
HCT VFR BLD CALC: 42.3 % (ref 37–47)
HEMOGLOBIN: 13.9 GM/DL (ref 12–16)
MCH RBC QN AUTO: 31 PG (ref 26–33)
MCHC RBC AUTO-ENTMCNC: 32.9 GM/DL (ref 32.2–35.5)
MCV RBC AUTO: 94.2 FL (ref 81–99)
PLATELET # BLD: 199 THOU/MM3 (ref 130–400)
PMV BLD AUTO: 10.8 FL (ref 9.4–12.4)
POTASSIUM SERPL-SCNC: 5.1 MEQ/L (ref 3.5–5.2)
RBC # BLD: 4.49 MILL/MM3 (ref 4.2–5.4)
SODIUM BLD-SCNC: 137 MEQ/L (ref 135–145)
WBC # BLD: 6.4 THOU/MM3 (ref 4.8–10.8)

## 2022-04-21 VITALS — SYSTOLIC BLOOD PRESSURE: 135 MMHG | DIASTOLIC BLOOD PRESSURE: 80 MMHG | BODY MASS INDEX: 28.7 KG/M2 | WEIGHT: 162 LBS

## 2022-09-08 ENCOUNTER — HOSPITAL ENCOUNTER (OUTPATIENT)
Dept: MAMMOGRAPHY | Age: 82
Discharge: HOME | End: 2022-09-08
Payer: MEDICARE

## 2022-09-08 ENCOUNTER — PROCEDURE VISIT (OUTPATIENT)
Dept: CARDIOLOGY CLINIC | Age: 82
End: 2022-09-08
Payer: MEDICARE

## 2022-09-08 DIAGNOSIS — Z12.31 VISIT FOR SCREENING MAMMOGRAM: ICD-10-CM

## 2022-09-08 DIAGNOSIS — Z95.0 PACEMAKER: Primary | ICD-10-CM

## 2022-09-08 PROCEDURE — 93280 PM DEVICE PROGR EVAL DUAL: CPT | Performed by: INTERNAL MEDICINE

## 2022-09-08 PROCEDURE — 77067 SCR MAMMO BI INCL CAD: CPT

## 2022-10-25 ENCOUNTER — NURSE ONLY (OUTPATIENT)
Dept: LAB | Age: 82
End: 2022-10-25

## 2022-10-25 LAB
ANION GAP SERPL CALCULATED.3IONS-SCNC: 9 MEQ/L (ref 8–16)
BUN BLDV-MCNC: 27 MG/DL (ref 7–22)
CALCIUM SERPL-MCNC: 8.8 MG/DL (ref 8.5–10.5)
CHLORIDE BLD-SCNC: 101 MEQ/L (ref 98–111)
CHOLESTEROL, TOTAL: 149 MG/DL (ref 100–199)
CO2: 23 MEQ/L (ref 23–33)
CREAT SERPL-MCNC: 1.4 MG/DL (ref 0.4–1.2)
ERYTHROCYTE [DISTWIDTH] IN BLOOD BY AUTOMATED COUNT: 13 % (ref 11.5–14.5)
ERYTHROCYTE [DISTWIDTH] IN BLOOD BY AUTOMATED COUNT: 44.8 FL (ref 35–45)
GFR SERPL CREATININE-BSD FRML MDRD: 37 ML/MIN/1.73M2
GLUCOSE BLD-MCNC: 87 MG/DL (ref 70–108)
HCT VFR BLD CALC: 38.2 % (ref 37–47)
HDLC SERPL-MCNC: 32 MG/DL
HEMOGLOBIN: 12.3 GM/DL (ref 12–16)
LDL CHOLESTEROL CALCULATED: 65 MG/DL
MCH RBC QN AUTO: 30.5 PG (ref 26–33)
MCHC RBC AUTO-ENTMCNC: 32.2 GM/DL (ref 32.2–35.5)
MCV RBC AUTO: 94.8 FL (ref 81–99)
PLATELET # BLD: 191 THOU/MM3 (ref 130–400)
PMV BLD AUTO: 10.6 FL (ref 9.4–12.4)
POTASSIUM SERPL-SCNC: 4.5 MEQ/L (ref 3.5–5.2)
RBC # BLD: 4.03 MILL/MM3 (ref 4.2–5.4)
SODIUM BLD-SCNC: 133 MEQ/L (ref 135–145)
TRIGL SERPL-MCNC: 258 MG/DL (ref 0–199)
WBC # BLD: 5.4 THOU/MM3 (ref 4.8–10.8)

## 2022-11-01 ENCOUNTER — OFFICE VISIT (OUTPATIENT)
Dept: CARDIOLOGY CLINIC | Age: 82
End: 2022-11-01
Payer: MEDICARE

## 2022-11-01 VITALS
DIASTOLIC BLOOD PRESSURE: 90 MMHG | HEART RATE: 73 BPM | SYSTOLIC BLOOD PRESSURE: 140 MMHG | OXYGEN SATURATION: 93 % | BODY MASS INDEX: 28 KG/M2 | WEIGHT: 158 LBS | HEIGHT: 63 IN

## 2022-11-01 DIAGNOSIS — R07.9 CHEST PAIN, UNSPECIFIED TYPE: ICD-10-CM

## 2022-11-01 DIAGNOSIS — I10 PRIMARY HYPERTENSION: Primary | ICD-10-CM

## 2022-11-01 PROCEDURE — 99213 OFFICE O/P EST LOW 20 MIN: CPT | Performed by: INTERNAL MEDICINE

## 2022-11-01 PROCEDURE — 1036F TOBACCO NON-USER: CPT | Performed by: INTERNAL MEDICINE

## 2022-11-01 PROCEDURE — 93000 ELECTROCARDIOGRAM COMPLETE: CPT | Performed by: INTERNAL MEDICINE

## 2022-11-01 PROCEDURE — G8427 DOCREV CUR MEDS BY ELIG CLIN: HCPCS | Performed by: INTERNAL MEDICINE

## 2022-11-01 PROCEDURE — 1123F ACP DISCUSS/DSCN MKR DOCD: CPT | Performed by: INTERNAL MEDICINE

## 2022-11-01 PROCEDURE — G8484 FLU IMMUNIZE NO ADMIN: HCPCS | Performed by: INTERNAL MEDICINE

## 2022-11-01 PROCEDURE — 3078F DIAST BP <80 MM HG: CPT | Performed by: INTERNAL MEDICINE

## 2022-11-01 PROCEDURE — G8417 CALC BMI ABV UP PARAM F/U: HCPCS | Performed by: INTERNAL MEDICINE

## 2022-11-01 PROCEDURE — G8400 PT W/DXA NO RESULTS DOC: HCPCS | Performed by: INTERNAL MEDICINE

## 2022-11-01 PROCEDURE — 3074F SYST BP LT 130 MM HG: CPT | Performed by: INTERNAL MEDICINE

## 2022-11-01 PROCEDURE — 1090F PRES/ABSN URINE INCON ASSESS: CPT | Performed by: INTERNAL MEDICINE

## 2022-11-01 RX ORDER — CLONIDINE HYDROCHLORIDE 0.1 MG/1
0.1 TABLET ORAL DAILY
Qty: 90 TABLET | Refills: 3 | Status: SHIPPED | OUTPATIENT
Start: 2022-11-01 | End: 2022-12-01 | Stop reason: SDUPTHER

## 2022-11-01 RX ORDER — ISOSORBIDE MONONITRATE 60 MG/1
60 TABLET, EXTENDED RELEASE ORAL DAILY
Qty: 90 TABLET | Refills: 3 | Status: SHIPPED | OUTPATIENT
Start: 2022-11-01 | End: 2022-12-01 | Stop reason: SDUPTHER

## 2022-11-01 RX ORDER — LOSARTAN POTASSIUM 100 MG/1
100 TABLET ORAL DAILY
Qty: 90 TABLET | Refills: 3 | Status: SHIPPED | OUTPATIENT
Start: 2022-11-01 | End: 2022-12-01

## 2022-11-01 RX ORDER — SPIRONOLACTONE 25 MG/1
50 TABLET ORAL DAILY
Qty: 180 TABLET | Refills: 3 | Status: SHIPPED | OUTPATIENT
Start: 2022-11-01 | End: 2022-12-01 | Stop reason: SDUPTHER

## 2022-11-01 RX ORDER — METOPROLOL TARTRATE 50 MG/1
50 TABLET, FILM COATED ORAL 2 TIMES DAILY
Qty: 180 TABLET | Refills: 3 | Status: SHIPPED | OUTPATIENT
Start: 2022-11-01 | End: 2022-11-01

## 2022-11-01 RX ORDER — LABETALOL 200 MG/1
200 TABLET, FILM COATED ORAL 2 TIMES DAILY
Qty: 180 TABLET | Refills: 3 | Status: SHIPPED | OUTPATIENT
Start: 2022-11-01 | End: 2022-12-01 | Stop reason: SDUPTHER

## 2022-11-01 ASSESSMENT — ENCOUNTER SYMPTOMS
NAUSEA: 0
VOMITING: 0
CHEST TIGHTNESS: 0
BLOOD IN STOOL: 0
WHEEZING: 0
ABDOMINAL DISTENTION: 0
COUGH: 0
CHOKING: 0
ABDOMINAL PAIN: 0
COLOR CHANGE: 0
TROUBLE SWALLOWING: 0
VOICE CHANGE: 0
APNEA: 0
STRIDOR: 0
ANAL BLEEDING: 0
SHORTNESS OF BREATH: 1

## 2022-11-01 NOTE — PROGRESS NOTES
[address] [facility]  [address] [facility],4Th Floor  Dept: [address] [facility]  Loc: [phone]     11/1/2022       Suzette Hale is here today for No chief complaint on file. Referring Physician:  No ref. provider found     Patient Active Problem List   Diagnosis    Maxillary antritis    Ethmoid sinusitis    Chronic rhinitis    Allergic rhinitis due to other allergen    Status post functional endoscopic sinus surgery    Chest pain    Hyperlipemia    HTN (hypertension)    CKD (chronic kidney disease), stage III (HCC)    Chronic sinusitis    Complete heart block (HCC)       Review of Systems   Constitutional:  Negative for activity change, appetite change, fatigue, fever and unexpected weight change. HENT:  Negative for congestion, trouble swallowing and voice change. Eyes:  Negative for visual disturbance. Respiratory:  Positive for shortness of breath. Negative for apnea, cough, choking, chest tightness, wheezing and stridor. Cardiovascular:  Negative for chest pain, palpitations and leg swelling. Gastrointestinal:  Negative for abdominal distention, abdominal pain, anal bleeding, blood in stool, nausea and vomiting. Endocrine: Negative for cold intolerance and heat intolerance. Genitourinary:  Negative for hematuria. Musculoskeletal:  Negative for arthralgias, gait problem, joint swelling and myalgias. Skin:  Negative for color change and rash. Allergic/Immunologic: Negative for environmental allergies and food allergies. Neurological:  Negative for dizziness, tremors, syncope, facial asymmetry, weakness, light-headedness, numbness and headaches. Hematological:  Does not bruise/bleed easily. Psychiatric/Behavioral:  Negative for agitation, behavioral problems and sleep disturbance.        Past Medical History:   Diagnosis Date    Arthritis     CAD (coronary artery disease)     Hyperlipidemia Hypertension     Ovarian cyst     right       Allergies   Allergen Reactions    Latex Itching and Rash    Shellfish-Derived Products Nausea And Vomiting    Amiodarone      Severe headache, throat and tongue swell, and blisters on tongue    Anaprox [Naproxen Sodium]      Not sure of reaction    Avelox [Moxifloxacin Hcl In Nacl]      Confusion and delerium    Biaxin [Clarithromycin]     Bumex [Bumetanide] Other (See Comments)     AFFECTS THE KIDNEYS    Ciprofloxacin Hcl     Codeine      Not sure of reaction    Doxycycline      Dizzy cold sweat syncope    Dyazide [Hydrochlorothiazide W-Triamterene]      Near syncope    Fexofenadine      Not sure of reaction    Fluoxetine      shakes    Hydralazine Nausea Only and Other (See Comments)     RING IN MY EARS    Hyoscyamine      Dizziness and affected eyes    Indocin [Indometacin Sodium]      Not sure of reaction    Mometasone      Nervousness, severe headache, body aches    Morphine Nausea Only     tiredness    Norvasc [Amlodipine Besylate] Swelling     Feet, legs, hand    Omnicef      Vaginal yeast infection    Plavix [Clopidogrel Bisulfate]      bruises    Prednisone Swelling    Promethazine Nausea Only     Stomach cramp    Relafen [Nabumetone] Hives     dizziness    Statins Depletion Therapy      Muscle ache cramp    Triamterene     Amoxicillin Itching and Rash    Cipro Xr Nausea And Vomiting     Stomach pain    Darvocet [Propoxyphene N-Acetaminophen] Itching and Rash    Diflucan [Fluconazole] Nausea And Vomiting     Stomach cramp    Dye [Iodides] Nausea And Vomiting    Hydrocodone Nausea And Vomiting     redness    Niacin And Related Itching and Rash     blisters    Pcn [Penicillins] Rash    Sulfa Antibiotics Itching and Rash    Viroxyn [Benzalkonium Chloride] Nausea Only, Swelling and Rash    Zonalon [Doxepin Hcl] Rash       Current Outpatient Medications   Medication Sig Dispense Refill    FIBER PO Take by mouth      losartan (COZAAR) 100 MG tablet Take 1 tablet by mouth daily 90 tablet 3    spironolactone (ALDACTONE) 25 MG tablet Take 2 tablets by mouth daily 180 tablet 3    isosorbide mononitrate (IMDUR) 60 MG extended release tablet Take 1 tablet by mouth daily 90 tablet 3    cloNIDine (CATAPRES) 0.1 MG tablet Take 1 tablet by mouth daily 90 tablet 3    labetalol (NORMODYNE) 200 MG tablet Take 1 tablet by mouth 2 times daily 180 tablet 3    Cholecalciferol (VITAMIN D3) 2000 UNITS CAPS Take 1 capsule by mouth daily      Multiple Vitamins-Minerals (OCUVITE PO) Take by mouth      Multiple Vitamins-Minerals (HAIR/SKIN/NAILS PO) Take by mouth      aspirin 81 MG EC tablet Take 4 tablets by mouth daily. (Patient taking differently: Take 81 mg by mouth daily) 1 tablet 0    acetaminophen (TYLENOL) 325 MG tablet Take  by mouth as needed for Pain. nitroGLYCERIN (NITROSTAT) 0.4 MG SL tablet Place 0.4 mg under the tongue every 5 minutes as needed for Chest pain. Ascorbic Acid (VITAMIN C) 500 MG tablet Take 500 mg by mouth daily. Cyanocobalamin (B-12 PO) Take 500 mcg by mouth daily. FLAXSEED Take 1,200 mg by mouth 2 times daily 2 tab      Ezetimibe (ZETIA PO) Take 10 mg by mouth nightly        No current facility-administered medications for this visit. Social History     Socioeconomic History    Marital status:       Spouse name: None    Number of children: None    Years of education: None    Highest education level: None   Tobacco Use    Smoking status: Never    Smokeless tobacco: Never   Vaping Use    Vaping Use: Never used   Substance and Sexual Activity    Alcohol use: No    Drug use: No    Sexual activity: Not Currently       Family History   Problem Relation Age of Onset    Early Death Mother 40         of pneumonia and pleurisy    Arthritis Father     Diabetes Father     Diabetes Other     Hypertension Other     Heart Failure Other     Allergies Other     Heart Disease Sister     Diabetes Brother     Diabetes Maternal Aunt     Arthritis Paternal Grandmother     Heart Disease Paternal Grandmother         murmur    Diabetes Paternal Grandfather        Blood pressure (!) 140/90, pulse 73, height 5' 3\" (1.6 m), weight 158 lb (71.7 kg), SpO2 93 %. Physical Exam:    General Appearance: alert and oriented to person, place and time, in no acute distress  Cardiovascular: normal rate, regular rhythm, normal S1 and S2, no murmurs, rubs, clicks, or gallops, distal pulses intact, no carotid bruits, no JVD  Pulmonary/Chest: clear to auscultation bilaterally- no wheezes, rales or rhonchi, normal air movement, no respiratory distress  Abdomen: soft, non-tender, non-distended, normal bowel sounds, no masses   Extremities: no cyanosis, clubbing or edema, pulse   Skin: warm and dry, no rash or erythema  Head: normocephalic and atraumatic  Eyes: pupils equal, round, and reactive to light  Neck: supple and non-tender without mass, no thyromegaly   Musculoskeletal: normal range of motion, no joint swelling, deformity or tenderness  Neurological: alert, oriented, normal speech, no focal findings or movement disorder noted    Lab Data:    Cardiac Enzymes:  No results for input(s): CKTOTAL, CKMB, CKMBINDEX, TROPONINI in the last 72 hours.     CBC:   Lab Results   Component Value Date/Time    WBC 5.4 10/25/2022 01:00 PM    RBC 4.03 10/25/2022 01:00 PM    RBC 4.70 04/28/2015 09:40 AM    HGB 12.3 10/25/2022 01:00 PM    HCT 38.2 10/25/2022 01:00 PM     10/25/2022 01:00 PM       CMP:    Lab Results   Component Value Date/Time     10/25/2022 01:00 PM    K 4.5 10/25/2022 01:00 PM     10/25/2022 01:00 PM    CO2 23 10/25/2022 01:00 PM    BUN 27 10/25/2022 01:00 PM    CREATININE 1.4 10/25/2022 01:00 PM    LABGLOM 37 10/25/2022 04:04 PM    GLUCOSE 87 10/25/2022 01:00 PM    GLUCOSE 98 04/28/2015 09:40 AM    CALCIUM 8.8 10/25/2022 01:00 PM       Hepatic Function Panel:    Lab Results   Component Value Date/Time    ALKPHOS 52 03/06/2021 12:30 PM    ALT 20 03/06/2021 12:30 PM    AST 19 03/06/2021 12:30 PM    PROT 7.6 03/06/2021 12:30 PM    BILITOT 0.5 03/06/2021 12:30 PM    BILIDIR <0.2 03/06/2021 12:30 PM    LABALBU 4.0 03/06/2021 12:30 PM       Magnesium:    Lab Results   Component Value Date/Time    MG 2.2 02/24/2019 01:15 AM       PT/INR:    Lab Results   Component Value Date/Time    PROTIME 1.01 10/30/2011 01:05 PM    INR 0.90 12/06/2018 09:13 PM       HgBA1c:  No results found for: LABA1C    FLP:    Lab Results   Component Value Date/Time    TRIG 258 10/25/2022 01:00 PM    HDL 32 10/25/2022 01:00 PM    LDLCALC 65 10/25/2022 01:00 PM       TSH:    Lab Results   Component Value Date/Time    TSH 4.055 03/02/2012 02:15 PM        Diagnosis Orders   1. Primary hypertension  40476 - [place] ELECTROCARDIOGRAM, COMPLETE    CBC    Basic Metabolic Panel    Lipid Panel    Hepatic Function Panel      2. Chest pain, unspecified type  64079 - [place] ELECTROCARDIOGRAM, COMPLETE    CBC    Basic Metabolic Panel    Lipid Panel    Hepatic Function Panel           Assessment/Plan    She is an 78years old lady with a history of pacemaker implantation hyper tension hypercholesterolemia. She had a heart cath back in 2015 which showed diffuse moderate coronary artery disease and been doing okay since then she has episode of marked elevation of blood pressure she takes the extra dose of clonidine as needed. Her blood pressure still elevated here she could not tolerate the Norvasc or the hydralazine the patient has been on the Aldactone and the metoprolol was exchanged to labetalol 200 mg twice a day prescription was sent to her pharmacy she will continue with the rest of other medications. The patient EKG showed a pacer rhythm she did had a pacemaker interrogation in July and September this year and the longevity is about 7.5. She no symptoms of congestive heart failure.     Orders Placed This Encounter   Procedures    CBC     Standing Status:   Future     Standing Expiration Date:   11/1/2023 Basic Metabolic Panel     Standing Status:   Future     Standing Expiration Date:   11/1/2023    Lipid Panel     Standing Status:   Future     Standing Expiration Date:   11/1/2023     Order Specific Question:   Is Patient Fasting?/# of Hours     Answer:   12 hours    Hepatic Function Panel     Standing Status:   Future     Standing Expiration Date:   11/1/2023    39370 - [place] ELECTROCARDIOGRAM, COMPLETE       Return in about 1 year (around 11/1/2023) for htn.      Manish Nelson MD

## 2022-11-11 RX ORDER — LOSARTAN POTASSIUM 50 MG/1
TABLET ORAL
Qty: 180 TABLET | OUTPATIENT
Start: 2022-11-11

## 2022-11-22 RX ORDER — EZETIMIBE 10 MG/1
TABLET ORAL
Qty: 90 TABLET | OUTPATIENT
Start: 2022-11-22

## 2022-11-22 RX ORDER — CLONIDINE HYDROCHLORIDE 0.1 MG/1
TABLET ORAL
Qty: 180 TABLET | OUTPATIENT
Start: 2022-11-22

## 2022-11-22 RX ORDER — LOSARTAN POTASSIUM 50 MG/1
TABLET ORAL
Qty: 180 TABLET | OUTPATIENT
Start: 2022-11-22

## 2022-11-22 RX ORDER — SPIRONOLACTONE 25 MG/1
TABLET ORAL
Qty: 90 TABLET | OUTPATIENT
Start: 2022-11-22

## 2022-12-01 RX ORDER — LOSARTAN POTASSIUM 50 MG/1
50 TABLET ORAL 2 TIMES DAILY
Qty: 180 TABLET | Refills: 3 | Status: SHIPPED | OUTPATIENT
Start: 2022-12-01

## 2022-12-01 RX ORDER — LOSARTAN POTASSIUM 50 MG/1
50 TABLET ORAL 2 TIMES DAILY
COMMUNITY
End: 2022-12-01 | Stop reason: SDUPTHER

## 2022-12-01 RX ORDER — ISOSORBIDE MONONITRATE 60 MG/1
60 TABLET, EXTENDED RELEASE ORAL 2 TIMES DAILY
Qty: 180 TABLET | Refills: 3 | Status: SHIPPED | OUTPATIENT
Start: 2022-12-01

## 2022-12-01 RX ORDER — CLONIDINE HYDROCHLORIDE 0.1 MG/1
0.1 TABLET ORAL 2 TIMES DAILY
Qty: 180 TABLET | Refills: 3 | Status: SHIPPED | OUTPATIENT
Start: 2022-12-01

## 2022-12-01 RX ORDER — LABETALOL 200 MG/1
200 TABLET, FILM COATED ORAL 2 TIMES DAILY
Qty: 180 TABLET | Refills: 3 | Status: SHIPPED | OUTPATIENT
Start: 2022-12-01

## 2022-12-01 RX ORDER — EZETIMIBE 10 MG/1
10 TABLET ORAL NIGHTLY
Qty: 90 TABLET | Refills: 3 | Status: SHIPPED | OUTPATIENT
Start: 2022-12-01

## 2022-12-01 RX ORDER — SPIRONOLACTONE 25 MG/1
25 TABLET ORAL DAILY
Qty: 90 TABLET | Refills: 3 | Status: SHIPPED | OUTPATIENT
Start: 2022-12-01

## 2023-01-07 ENCOUNTER — HOSPITAL ENCOUNTER (EMERGENCY)
Age: 83
Discharge: HOME | End: 2023-01-07
Payer: MEDICARE

## 2023-01-07 ENCOUNTER — APPOINTMENT (OUTPATIENT)
Dept: GENERAL RADIOLOGY | Age: 83
End: 2023-01-07
Payer: MEDICARE

## 2023-01-07 VITALS
WEIGHT: 158 LBS | RESPIRATION RATE: 18 BRPM | TEMPERATURE: 98.7 F | HEART RATE: 73 BPM | DIASTOLIC BLOOD PRESSURE: 74 MMHG | OXYGEN SATURATION: 96 % | BODY MASS INDEX: 27.99 KG/M2 | SYSTOLIC BLOOD PRESSURE: 129 MMHG

## 2023-01-07 DIAGNOSIS — J06.9 VIRAL URI WITH COUGH: Primary | ICD-10-CM

## 2023-01-07 PROCEDURE — 71046 X-RAY EXAM CHEST 2 VIEWS: CPT

## 2023-01-07 PROCEDURE — 99202 OFFICE O/P NEW SF 15 MIN: CPT | Performed by: NURSE PRACTITIONER

## 2023-01-07 PROCEDURE — 99213 OFFICE O/P EST LOW 20 MIN: CPT

## 2023-01-07 ASSESSMENT — ENCOUNTER SYMPTOMS
SHORTNESS OF BREATH: 0
SINUS PRESSURE: 0
CHEST TIGHTNESS: 0
COUGH: 1
NAUSEA: 0
VOMITING: 0
EYE REDNESS: 0
WHEEZING: 1
EYE ITCHING: 0
RHINORRHEA: 1
ABDOMINAL PAIN: 0
SORE THROAT: 0
DIARRHEA: 0

## 2023-01-07 ASSESSMENT — PAIN - FUNCTIONAL ASSESSMENT: PAIN_FUNCTIONAL_ASSESSMENT: NONE - DENIES PAIN

## 2023-01-07 NOTE — ED PROVIDER NOTES
[address]       Chief Complaint   Patient presents with    Cough     Wheezing, nasal drainage       Nurses Notes reviewed and I agree except as noted in the HPI. HISTORY OF PRESENT ILLNESS   Missy Rivas is a 78 y.o. female who presents to the urgent care for evaluation. She states that her symptoms of wheezing cough and nasal drainage started yesterday. She declines COVID testing stating that she has not been exposed and she has been vaccinated. The patient/patient representative has no other acute complaints at this time. REVIEW OF SYSTEMS     Review of Systems   Constitutional:  Negative for chills, fatigue and fever. HENT:  Positive for rhinorrhea. Negative for congestion, ear pain, sinus pressure and sore throat. Eyes:  Negative for redness and itching. Respiratory:  Positive for cough and wheezing. Negative for chest tightness and shortness of breath. Cardiovascular:  Negative for chest pain. Gastrointestinal:  Negative for abdominal pain, diarrhea, nausea and vomiting. Skin:  Negative for rash. Allergic/Immunologic: Negative for environmental allergies and food allergies. Neurological:  Negative for headaches. Hematological:  Negative for adenopathy. PAST MEDICAL HISTORY         Diagnosis Date    Arthritis     CAD (coronary artery disease)     Hyperlipidemia     Hypertension     Ovarian cyst     right       SURGICAL HISTORY     Patient  has a past surgical history that includes Foot surgery (Right, 2004 & 2005); sinus surgery; Septoplasty (09/28/2011); Turbinoplasties (09/28/2011); Dilation and curettage of uterus (01/01/1980); Partial hysterectomy (01/01/1981); Cholecystectomy (01/01/1986); pacemaker placement (01/01/2004); Ovary removal (Bilateral, 03/23/2013); Cardiac catheterization (01/01/2007); Hysterectomy (01/01/1981); Tonsillectomy; Colonoscopy; and Foot surgery (Left, 06/24/2015).     CURRENT MEDICATIONS       Previous Medications    ACETAMINOPHEN (TYLENOL) 325 MG TABLET    Take  by mouth as needed for Pain. ASCORBIC ACID (VITAMIN C) 500 MG TABLET    Take 500 mg by mouth daily. ASPIRIN 81 MG EC TABLET    Take 4 tablets by mouth daily. CHOLECALCIFEROL (VITAMIN D3) 2000 UNITS CAPS    Take 1 capsule by mouth daily    CLONIDINE (CATAPRES) 0.1 MG TABLET    Take 1 tablet by mouth 2 times daily    CYANOCOBALAMIN (B-12 PO)    Take 500 mcg by mouth daily. EZETIMIBE (ZETIA) 10 MG TABLET    Take 1 tablet by mouth nightly    FIBER PO    Take by mouth    FLAXSEED    Take 1,200 mg by mouth 2 times daily 2 tab    ISOSORBIDE MONONITRATE (IMDUR) 60 MG EXTENDED RELEASE TABLET    Take 1 tablet by mouth 2 times daily    LABETALOL (NORMODYNE) 200 MG TABLET    Take 1 tablet by mouth 2 times daily    LOSARTAN (COZAAR) 50 MG TABLET    Take 1 tablet by mouth 2 times daily    MULTIPLE VITAMINS-MINERALS (HAIR/SKIN/NAILS PO)    Take by mouth    MULTIPLE VITAMINS-MINERALS (OCUVITE PO)    Take by mouth    NITROGLYCERIN (NITROSTAT) 0.4 MG SL TABLET    Place 0.4 mg under the tongue every 5 minutes as needed for Chest pain.     SPIRONOLACTONE (ALDACTONE) 25 MG TABLET    Take 1 tablet by mouth daily       ALLERGIES     Patient is is allergic to latex, shellfish-derived products, amiodarone, anaprox [naproxen sodium], avelox [moxifloxacin hcl in nacl], biaxin [clarithromycin], bumex [bumetanide], ciprofloxacin hcl, codeine, doxycycline, dyazide [hydrochlorothiazide w-triamterene], fexofenadine, fluoxetine, hydralazine, hyoscyamine, indocin [indometacin sodium], mometasone, morphine, norvasc [amlodipine besylate], omnicef, plavix [clopidogrel bisulfate], prednisone, promethazine, relafen [nabumetone], statins depletion therapy, triamterene, amoxicillin, cipro xr, darvocet [propoxyphene n-acetaminophen], diflucan [fluconazole], dye [iodides], hydrocodone, niacin and related, pcn [penicillins], sulfa antibiotics, viroxyn [benzalkonium chloride], and zonalon [doxepin hcl]. FAMILY HISTORY     Patient'sfamily history includes Allergies in an other family member; Arthritis in her father and paternal grandmother; Diabetes in her brother, father, maternal aunt, paternal grandfather, and another family member; Early Death (age of onset: 40) in her mother; Heart Disease in her paternal grandmother and sister; Heart Failure in an other family member; Hypertension in an other family member. SOCIAL HISTORY     Patient  reports that she has never smoked. She has never used smokeless tobacco. She reports that she does not drink alcohol and does not use drugs. PHYSICAL EXAM     ED TRIAGE VITALS  BP: 129/74, Temp: 98.7 °F (37.1 °C), Heart Rate: 73, Resp: 18, SpO2: 96 %  Physical Exam  Vitals and nursing note reviewed. Constitutional:       General: She is not in acute distress. Appearance: Normal appearance. She is well-developed and well-groomed. HENT:      Head: Normocephalic and atraumatic. Right Ear: External ear normal.      Left Ear: External ear normal.      Nose: Nose normal.      Mouth/Throat:      Lips: Pink. Mouth: Mucous membranes are moist.      Pharynx: Oropharynx is clear. Eyes:      Conjunctiva/sclera: Conjunctivae normal.      Right eye: Right conjunctiva is not injected. Left eye: Left conjunctiva is not injected. Pupils: Pupils are equal.   Cardiovascular:      Rate and Rhythm: Normal rate. Heart sounds: Normal heart sounds. Pulmonary:      Effort: Pulmonary effort is normal. No respiratory distress. Breath sounds: Examination of the right-lower field reveals decreased breath sounds. Examination of the left-lower field reveals decreased breath sounds. Decreased breath sounds present. Musculoskeletal:      Cervical back: Normal range of motion. Skin:     General: Skin is warm and dry. Findings: No rash (on exposed surfaces).    Neurological:      Mental Status: She is alert and oriented to person, place, and time. Gait: Gait is intact. Psychiatric:         Mood and Affect: Mood normal.         Speech: Speech normal.         Behavior: Behavior is cooperative. DIAGNOSTIC RESULTS   Labs:  Abnormal Labs Reviewed - No data to display     IMAGING:  XR CHEST (2 VW)   Final Result   1. No acute cardiopulmonary finding. **This report has been created using voice recognition software. It may contain minor errors which are inherent in voice recognition technology. **      Final report electronically signed by Dr Olu Kowalski on 1/7/2023 12:10 PM        URGENT CARE COURSE:     Vitals:    01/07/23 1131   BP: 129/74   Pulse: 73   Resp: 18   Temp: 98.7 °F (37.1 °C)   TempSrc: Temporal   SpO2: 96%   Weight: 158 lb (71.7 kg)       Medications - No data to display  PROCEDURES:  FINALIMPRESSION      1. Viral URI with cough        DISPOSITION/PLAN   DISPOSITION Decision To Discharge 01/07/2023 12:14:23 PM    X-ray per radiologist read is unremarkable. COVID-19 testing declined. Patient is afebrile and will be managed as a viral illness at this time. Problem List Items Addressed This Visit    None  Visit Diagnoses       Viral URI with cough    -  Primary            Physical assessment findings, diagnostic testing(s) if applicable, and vital signs reviewed with patient/patient representative. Differential diagnosis(s) discussed with patient/patient representative. Prescription medications and/or over-the-counter medications for symptom management discussed. Patient is to follow-up with family care provider in 2-3 days if no improvement. If symptoms should worsen or change, go to the ED. Patient/patient representative is aware of care plan, questions answered, verbalizes understanding and is in agreement. Printed instructions attached to after visit summary. /isolate according to [facility] guidelines.         PATIENT REFERRED TO:  Amanda Payne MD  [address] [facility]  [phone]    Schedule an appointment as soon as possible for a visit in 3 days  For further evaluation. , If symptoms change/worsen, go to the [facility], APRN - CNP    Please note that some or all of this chart was generated using [employer] voice recognition software. Although every effort was made to ensure the accuracy of this automated transcription, some errors in transcription may have occurred.         CELINA Medeiros CNP  01/07/23 3545

## 2023-01-07 NOTE — ED TRIAGE NOTES
Patient to room with c/o nasal drainage and productive cough beginning one week ago. C/o wheezing yesterday evening. Declines COVID and flu testing at this time. States, \"My main concern is the wheezing from last night. \"

## 2023-02-21 ENCOUNTER — NURSE ONLY (OUTPATIENT)
Dept: LAB | Age: 83
End: 2023-02-21

## 2023-02-21 LAB
T4 FREE SERPL-MCNC: 1.19 NG/DL (ref 0.93–1.76)
TSH SERPL DL<=0.005 MIU/L-ACNC: 2.61 UIU/ML (ref 0.4–4.2)

## 2023-03-02 ENCOUNTER — PROCEDURE VISIT (OUTPATIENT)
Dept: CARDIOLOGY CLINIC | Age: 83
End: 2023-03-02
Payer: MEDICARE

## 2023-03-02 DIAGNOSIS — Z95.0 PACEMAKER: Primary | ICD-10-CM

## 2023-03-02 PROCEDURE — 93280 PM DEVICE PROGR EVAL DUAL: CPT | Performed by: INTERNAL MEDICINE

## 2023-03-10 ENCOUNTER — OFFICE VISIT (OUTPATIENT)
Dept: CARDIOLOGY CLINIC | Age: 83
End: 2023-03-10
Payer: MEDICARE

## 2023-03-10 VITALS
HEART RATE: 61 BPM | SYSTOLIC BLOOD PRESSURE: 105 MMHG | BODY MASS INDEX: 28.17 KG/M2 | RESPIRATION RATE: 18 BRPM | DIASTOLIC BLOOD PRESSURE: 46 MMHG | HEIGHT: 63 IN | WEIGHT: 159 LBS

## 2023-03-10 DIAGNOSIS — Z95.0 PACEMAKER: ICD-10-CM

## 2023-03-10 DIAGNOSIS — E78.00 PURE HYPERCHOLESTEROLEMIA: ICD-10-CM

## 2023-03-10 DIAGNOSIS — I48.92 PAROXYSMAL ATRIAL FLUTTER (HCC): Primary | ICD-10-CM

## 2023-03-10 DIAGNOSIS — I10 PRIMARY HYPERTENSION: ICD-10-CM

## 2023-03-10 PROCEDURE — 93000 ELECTROCARDIOGRAM COMPLETE: CPT | Performed by: INTERNAL MEDICINE

## 2023-03-10 RX ORDER — METOPROLOL SUCCINATE 50 MG/1
50 TABLET, EXTENDED RELEASE ORAL DAILY
Qty: 30 TABLET | Refills: 3 | Status: SHIPPED | OUTPATIENT
Start: 2023-03-10

## 2023-03-10 ASSESSMENT — ENCOUNTER SYMPTOMS
GASTROINTESTINAL NEGATIVE: 1
RESPIRATORY NEGATIVE: 1

## 2023-03-10 NOTE — PROGRESS NOTES
[address] [facility] [address]  Dept: [address]: [phone]           Chief Complaint   Patient presents with    Follow-up       Cardiologist:  Dr. Elly Wilcox      Today's visit: 3/10/2023    Subjective:    Review of Systems   Constitutional: Negative. Respiratory: Negative. Cardiovascular: Negative. Gastrointestinal: Negative. Musculoskeletal: Negative. Neurological: Negative. Psychiatric/Behavioral: Negative.             Past Medical History:   Diagnosis Date    Arthritis     CAD (coronary artery disease)     Hyperlipidemia     Hypertension     Ovarian cyst     right       Allergies   Allergen Reactions    Latex Itching and Rash    Shellfish-Derived Products Nausea And Vomiting    Amiodarone      Severe headache, throat and tongue swell, and blisters on tongue    Anaprox [Naproxen Sodium]      Not sure of reaction    Avelox [Moxifloxacin Hcl In Nacl]      Confusion and delerium    Biaxin [Clarithromycin]     Bumex [Bumetanide] Other (See Comments)     AFFECTS THE KIDNEYS    Ciprofloxacin Hcl     Codeine      Not sure of reaction    Doxycycline      Dizzy cold sweat syncope    Dyazide [Hydrochlorothiazide W-Triamterene]      Near syncope    Fexofenadine      Not sure of reaction    Fluoxetine      shakes    Hydralazine Nausea Only and Other (See Comments)     RING IN MY EARS    Hyoscyamine      Dizziness and affected eyes    Indocin [Indometacin Sodium]      Not sure of reaction    Mometasone      Nervousness, severe headache, body aches    Morphine Nausea Only     tiredness    Norvasc [Amlodipine Besylate] Swelling     Feet, legs, hand    Omnicef      Vaginal yeast infection    Plavix [Clopidogrel Bisulfate]      bruises    Prednisone Swelling    Promethazine Nausea Only     Stomach cramp    Relafen [Nabumetone] Hives     dizziness    Statins Depletion Therapy      Muscle ache cramp Triamterene     Amoxicillin Itching and Rash    Cipro Xr Nausea And Vomiting     Stomach pain    Darvocet [Propoxyphene N-Acetaminophen] Itching and Rash    Diflucan [Fluconazole] Nausea And Vomiting     Stomach cramp    Dye [Iodides] Nausea And Vomiting    Hydrocodone Nausea And Vomiting     redness    Niacin And Related Itching and Rash     blisters    Pcn [Penicillins] Rash    Sulfa Antibiotics Itching and Rash    Viroxyn [Benzalkonium Chloride] Nausea Only, Swelling and Rash    Zonalon [Doxepin Hcl] Rash       Current Outpatient Medications   Medication Sig Dispense Refill    apixaban (ELIQUIS) 5 MG TABS tablet Take 1 tablet by mouth 2 times daily 180 tablet 1    metoprolol succinate (TOPROL XL) 50 MG extended release tablet Take 1 tablet by mouth daily 30 tablet 3    cloNIDine (CATAPRES) 0.1 MG tablet Take 1 tablet by mouth 2 times daily 180 tablet 3    losartan (COZAAR) 50 MG tablet Take 1 tablet by mouth 2 times daily 180 tablet 3    spironolactone (ALDACTONE) 25 MG tablet Take 1 tablet by mouth daily 90 tablet 3    isosorbide mononitrate (IMDUR) 60 MG extended release tablet Take 1 tablet by mouth 2 times daily 180 tablet 3    ezetimibe (ZETIA) 10 MG tablet Take 1 tablet by mouth nightly 90 tablet 3    FIBER PO Take by mouth      Cholecalciferol (VITAMIN D3) 2000 UNITS CAPS Take 1 capsule by mouth daily      Multiple Vitamins-Minerals (OCUVITE PO) Take by mouth      Multiple Vitamins-Minerals (HAIR/SKIN/NAILS PO) Take by mouth      acetaminophen (TYLENOL) 325 MG tablet Take  by mouth as needed for Pain. nitroGLYCERIN (NITROSTAT) 0.4 MG SL tablet Place 0.4 mg under the tongue every 5 minutes as needed for Chest pain. Ascorbic Acid (VITAMIN C) 500 MG tablet Take 500 mg by mouth daily. Cyanocobalamin (B-12 PO) Take 500 mcg by mouth daily. No current facility-administered medications for this visit. Social History     Socioeconomic History    Marital status:       Spouse name: None    Number of children: None    Years of education: None    Highest education level: None   Tobacco Use    Smoking status: Never    Smokeless tobacco: Never   Vaping Use    Vaping Use: Never used   Substance and Sexual Activity    Alcohol use: No    Drug use: No    Sexual activity: Not Currently       Family History   Problem Relation Age of Onset    Early Death Mother 40         of pneumonia and pleurisy    Arthritis Father     Diabetes Father     Diabetes Other     Hypertension Other     Heart Failure Other     Allergies Other     Heart Disease Sister     Diabetes Brother     Diabetes Maternal Aunt     Arthritis Paternal Grandmother     Heart Disease Paternal Grandmother         murmur    Diabetes Paternal Grandfather        Blood pressure (!) 105/46, pulse 61, resp. rate 18, height 5' 3\" (1.6 m), weight 159 lb (72.1 kg). Physical Exam  Constitutional:       Appearance: Normal appearance. Cardiovascular:      Rate and Rhythm: Normal rate and regular rhythm. Heart sounds: Normal heart sounds. Pulmonary:      Effort: Pulmonary effort is normal.      Breath sounds: Normal breath sounds. Abdominal:      General: Bowel sounds are normal.      Palpations: Abdomen is soft. Musculoskeletal:         General: Normal range of motion. Skin:     General: Skin is warm and dry. Neurological:      General: No focal deficit present. Mental Status: She is alert and oriented to person, place, and time. Psychiatric:         Mood and Affect: Mood normal.         Behavior: Behavior normal.         EKG: paced no ectopy. Diagnosis Orders   1. Paroxysmal atrial flutter ([address]        2. Primary hypertension  [address] - [place] ELECTROCARDIOGRAM, COMPLETE    Lipid Panel    Hepatic Function Panel    Basic Metabolic Panel    CBC      3. Pure hypercholesterolemia  Lipid Panel    Hepatic Function Panel    Basic Metabolic Panel    CBC      4.  Pacemaker              Assessment and Plan:  Matteo Bolton is a pleasant 39-year-old female she presents today in follow-up. She recently had a pacemaker interrogation that revealed some paroxysmal a flutter. She was called to come in to discuss the results. I did discuss with her what a flutter was and the origins of it and why we should be on a NOAC for that. Ann Shah agrees with this. Currently denies any type of chest pain palpitations fluttering or near syncope. She does not have a history of falls she does not have history of GI bleeding. We did discuss Eliquis and she is willing to start that medication. Currently today she is in a paced rhythm    PAFL on PPM interrogation -change labetalol to Toprol 50 mg daily and Eliquis 5 mg twice daily for embolic phenomenon    Hypertension-stable today continue current medications. She is going to watch her blood pressure with recent medication changes and let us know should it be above 787 systolic and need further adjustment. Hyperlipidemia tolerating statin no problems    Nonobstructive coronary artery disease by cardiac catheterization in 2013      Ann Shah is doing well today she has no cardiac complaints. Did discuss paroxysmal a flutter with her. We changed her labetalol to Toprol 50 mg daily. We started her on Eliquis 5 mg twice daily. She understands the risks of being on Eliquis. She understands the risks of not being on Eliquis also. She is going to monitor her blood pressure with her current medication change and let us know if her systolic is above 802. We will see her back in 1 years time or sooner should she have any difficulties or concerns            Orders Placed This Encounter   Procedures    Lipid Panel     Standing Status:   Future     Standing Expiration Date:   3/10/2024     Order Specific Question:   Is Patient Fasting?/# of Hours     Answer:   15     Order Specific Question:   Has the patient fasted?      Answer:   Yes    Hepatic Function Panel     Standing Status:   Future     Standing Expiration Date:   2/12/2549    Basic Metabolic Panel     Standing Status:   Future     Standing Expiration Date:   3/10/2024    CBC     Standing Status:   Future     Standing Expiration Date:   3/10/2024    [address] - [place] ELECTROCARDIOGRAM, COMPLETE     Continue Dr Kerry Peng current treatment plan    There are no Patient Instructions on file for this visit. Return in about 1 year (around 3/10/2024), or if symptoms worsen or fail to improve, for pafl.     Follow up with Dr Devyn Brewster as scheduled or sooner if needed    Anastacio Lacey, APRN - CNP

## 2023-03-20 ENCOUNTER — TELEPHONE (OUTPATIENT)
Dept: CARDIOLOGY CLINIC | Age: 83
End: 2023-03-20

## 2023-03-20 NOTE — TELEPHONE ENCOUNTER
ALISON CALLED IN TODAY REGARDING MED CHANGES AT 3/10/23 APPOINTMENT. HER LABETALOL WAS CHANGED TO TOPROL 50 MG QD. SHE WOKE UP AT 2:30 AM THIS MORINING HER EARS WERE RINGING B/P 178/74 SHE TOOK AN EXTRA DOSE OF CLONIDINE ON HER OWN THEN AGAIN AT 3:30 AM B/P /73. SHE THEN DRANK HIBISCUS TEA AND AT 4:30 AM HER B/P /66. CALLED TO PT SHE IS TO RESTART HER LABETALOL 200 MG BID. STOP TOPROL. SHE VOICED UNDERSTANDING AND WILL KEEP AN EYE ON HER B/P. WILL CALL IF SHE NEEDS US.

## 2023-03-20 NOTE — TELEPHONE ENCOUNTER
DISCONTINUE TOPROL ORDER  LABETALOL 200 MG TWICE DAILY    LAST APPT 03/10/2023  NEXT APPT 03/12/2024

## 2023-03-21 RX ORDER — LABETALOL 200 MG/1
200 TABLET, FILM COATED ORAL 2 TIMES DAILY
Qty: 180 TABLET | Refills: 3 | Status: SHIPPED | OUTPATIENT
Start: 2023-03-21

## 2023-03-23 NOTE — PROGRESS NOTES
Dr Alexx Romero pt     [facility] [employer] [place] dual pacer check in office on 3/2/23 per [address], [employer]    Battery 3.8 yrs remaining  A paced 65%  V paced >99%  Dddr     P waves 1.1  Rv waves dependent     Atrial impedence 560  Vent impedence 540    Atrial threshold 1.5 @ 0.5  Vent threshold 0.875 @ 0.5    Atrial and vent amplitudes auto       9/19/22 had an episode of aflutter per the rep    Ams episodes x 2    Pt was seen in office by DR Alexx Romero on 3/10/23 to address the aflutter / pt was started on Eliquis

## 2023-05-12 DIAGNOSIS — I48.92 PAROXYSMAL ATRIAL FLUTTER (HCC): ICD-10-CM

## 2023-06-21 ENCOUNTER — TELEPHONE (OUTPATIENT)
Dept: CARDIOLOGY CLINIC | Age: 83
End: 2023-06-21

## 2023-06-21 NOTE — TELEPHONE ENCOUNTER
Claudell Carwin called and Eliquis is too expensive for her. Dr. Bobbette Merlin said she can try the Coumadin. Patient is not interested in the Coumadin will stay on the Eliquis.

## 2023-08-10 ENCOUNTER — OFFICE VISIT (OUTPATIENT)
Dept: NEUROLOGY | Age: 83
End: 2023-08-10
Payer: MEDICARE

## 2023-08-10 VITALS
HEART RATE: 62 BPM | BODY MASS INDEX: 28.17 KG/M2 | DIASTOLIC BLOOD PRESSURE: 60 MMHG | WEIGHT: 159 LBS | OXYGEN SATURATION: 98 % | SYSTOLIC BLOOD PRESSURE: 120 MMHG | HEIGHT: 63 IN

## 2023-08-10 DIAGNOSIS — R25.9 MIXED ACTION AND RESTING TREMOR: Primary | ICD-10-CM

## 2023-08-10 PROCEDURE — 1123F ACP DISCUSS/DSCN MKR DOCD: CPT | Performed by: PSYCHIATRY & NEUROLOGY

## 2023-08-10 PROCEDURE — 3074F SYST BP LT 130 MM HG: CPT | Performed by: PSYCHIATRY & NEUROLOGY

## 2023-08-10 PROCEDURE — 3078F DIAST BP <80 MM HG: CPT | Performed by: PSYCHIATRY & NEUROLOGY

## 2023-08-10 PROCEDURE — G8417 CALC BMI ABV UP PARAM F/U: HCPCS | Performed by: PSYCHIATRY & NEUROLOGY

## 2023-08-10 PROCEDURE — 1090F PRES/ABSN URINE INCON ASSESS: CPT | Performed by: PSYCHIATRY & NEUROLOGY

## 2023-08-10 PROCEDURE — 1036F TOBACCO NON-USER: CPT | Performed by: PSYCHIATRY & NEUROLOGY

## 2023-08-10 PROCEDURE — G8400 PT W/DXA NO RESULTS DOC: HCPCS | Performed by: PSYCHIATRY & NEUROLOGY

## 2023-08-10 PROCEDURE — 99204 OFFICE O/P NEW MOD 45 MIN: CPT | Performed by: PSYCHIATRY & NEUROLOGY

## 2023-08-10 PROCEDURE — G8427 DOCREV CUR MEDS BY ELIG CLIN: HCPCS | Performed by: PSYCHIATRY & NEUROLOGY

## 2023-08-10 RX ORDER — TOPIRAMATE 25 MG/1
25 TABLET ORAL DAILY
Qty: 30 TABLET | Refills: 3 | Status: SHIPPED | OUTPATIENT
Start: 2023-08-10

## 2023-08-10 RX ORDER — PRAMIPEXOLE DIHYDROCHLORIDE 0.25 MG/1
0.25 TABLET ORAL 2 TIMES DAILY
Qty: 60 TABLET | Refills: 3 | Status: SHIPPED | OUTPATIENT
Start: 2023-08-10

## 2023-08-10 NOTE — PATIENT INSTRUCTIONS
Start Topamax 25 mg daily, take with plenty of fluids  Start Mirapex 0.25 mg two times a day  Vitamin B12, folate  Vitamin B6 level  Call with any new symptoms or concerns. Follow up in 6 weeks.

## 2023-08-11 ENCOUNTER — NURSE ONLY (OUTPATIENT)
Dept: LAB | Age: 83
End: 2023-08-11

## 2023-08-11 DIAGNOSIS — R25.9 MIXED ACTION AND RESTING TREMOR: ICD-10-CM

## 2023-08-12 LAB
FOLATE SERPL-MCNC: 8.7 NG/ML (ref 4.8–24.2)
VIT B12 SERPL-MCNC: 868 PG/ML (ref 211–911)

## 2023-08-16 LAB — PYRIDOXAL PHOS SERPL-SCNC: 68.9 NMOL/L (ref 20–125)

## 2023-08-17 NOTE — PROGRESS NOTES
Chief Complaint   Patient presents with    Follow-up    Consultation     Tremor      Joanna Amezquita is a 78 y.o. female who presents today for evaluation of bilateral hand tremor for the past 20 years that have progressively gotten worse in the past 1 year. Her symptoms are worse with activity and when she is nervous. She does not have trouble with using utensils to eat. She is bothered by her symptoms in a social setting. She is on Eliquis due to a-fib. No family history of tremor. No history of head injury. No history of glaucoma. She  denies chest pain. No shortness of breath, no neck pain. No vision changes. No dysphagia. No fever. No rash. No weight loss. History provided by patient.        Past Medical History:   Diagnosis Date    Arthritis     CAD (coronary artery disease)     Hyperlipidemia     Hypertension     Ovarian cyst     right       Patient Active Problem List   Diagnosis    Maxillary antritis    Ethmoid sinusitis    Chronic rhinitis    Allergic rhinitis due to other allergen    Status post functional endoscopic sinus surgery    Chest pain    Hyperlipemia    HTN (hypertension)    CKD (chronic kidney disease), stage III (HCC)    Chronic sinusitis    Complete heart block (HCC)       Allergies   Allergen Reactions    Latex Itching and Rash    Shellfish-Derived Products Nausea And Vomiting    Amiodarone      Severe headache, throat and tongue swell, and blisters on tongue    Anaprox [Naproxen Sodium]      Not sure of reaction    Avelox [Moxifloxacin Hcl In Nacl]      Confusion and delerium    Biaxin [Clarithromycin]     Bumex [Bumetanide] Other (See Comments)     AFFECTS THE KIDNEYS    Ciprofloxacin Hcl     Codeine      Not sure of reaction    Doxycycline      Dizzy cold sweat syncope    Dyazide [Hydrochlorothiazide W-Triamterene]      Near syncope    Fexofenadine      Not sure of reaction    Fluoxetine      shakes    Hydralazine Nausea Only and Other (See Comments)     RING IN MY EARS    Hyoscyamine

## 2023-08-17 NOTE — TELEPHONE ENCOUNTER
Patient started on Topamax 25 mg daily and Mirapex 0.25 mg BID at last visit on 8/10/2023. Patient took medication as prescribed for 2 days with complaints of nausea, vomiting, increased tremors, and fatigue. Stopped medication after symptoms occurred. Requesting alternative medications. Please advise.

## 2023-08-17 NOTE — TELEPHONE ENCOUNTER
If she is on the Mirapex also, change to 0.25 mg three times a day. She wast started on two medications at the same time.    Grecia Salcedo MD

## 2023-08-18 RX ORDER — ROPINIROLE 0.25 MG/1
0.25 TABLET, FILM COATED ORAL 2 TIMES DAILY
Qty: 60 TABLET | Refills: 0 | Status: SHIPPED | OUTPATIENT
Start: 2023-08-18 | End: 2023-09-17

## 2023-08-18 NOTE — TELEPHONE ENCOUNTER
Spoke with the patient regarding provider response. Verbalized understanding with no questions at this time.

## 2023-08-18 NOTE — TELEPHONE ENCOUNTER
Spoke to patient who has stopped both medications at this time- Topamax and Mirapex. Plan per : Start Requip 0.25 mg po BID (9 am and 5 pm).

## 2023-08-31 RX ORDER — EZETIMIBE 10 MG/1
TABLET ORAL
Qty: 90 TABLET | Refills: 0 | Status: SHIPPED | OUTPATIENT
Start: 2023-08-31

## 2023-09-05 ENCOUNTER — NURSE ONLY (OUTPATIENT)
Dept: CARDIOLOGY CLINIC | Age: 83
End: 2023-09-05
Payer: MEDICARE

## 2023-09-05 DIAGNOSIS — Z95.0 PACEMAKER: Primary | ICD-10-CM

## 2023-09-05 PROCEDURE — 93280 PM DEVICE PROGR EVAL DUAL: CPT | Performed by: INTERNAL MEDICINE

## 2023-09-08 RX ORDER — ISOSORBIDE MONONITRATE 60 MG/1
60 TABLET, EXTENDED RELEASE ORAL 2 TIMES DAILY
Qty: 180 TABLET | Refills: 3 | Status: SHIPPED | OUTPATIENT
Start: 2023-09-08

## 2023-09-13 RX ORDER — SPIRONOLACTONE 25 MG/1
25 TABLET ORAL DAILY
Qty: 90 TABLET | Refills: 1 | Status: SHIPPED | OUTPATIENT
Start: 2023-09-13

## 2023-09-13 RX ORDER — LABETALOL 200 MG/1
200 TABLET, FILM COATED ORAL 2 TIMES DAILY
Qty: 180 TABLET | Refills: 1 | Status: SHIPPED | OUTPATIENT
Start: 2023-09-13

## 2023-09-13 RX ORDER — CLONIDINE HYDROCHLORIDE 0.1 MG/1
0.1 TABLET ORAL 2 TIMES DAILY
Qty: 180 TABLET | Refills: 1 | Status: SHIPPED | OUTPATIENT
Start: 2023-09-13

## 2023-09-13 RX ORDER — LOSARTAN POTASSIUM 50 MG/1
50 TABLET ORAL 2 TIMES DAILY
Qty: 180 TABLET | Refills: 1 | Status: SHIPPED | OUTPATIENT
Start: 2023-09-13

## 2023-09-13 NOTE — TELEPHONE ENCOUNTER
Labetalol 200 mg twice daily  Clonidine 0.1 mg twice daily  Losartan 50 mg twice daily  Spironolactone 25mg daily    Next appt 03/12/24

## 2023-09-14 DIAGNOSIS — R25.9 MIXED ACTION AND RESTING TREMOR: Primary | ICD-10-CM

## 2023-09-14 RX ORDER — ROPINIROLE 0.25 MG/1
0.25 TABLET, FILM COATED ORAL 2 TIMES DAILY
Qty: 60 TABLET | Refills: 1 | Status: SHIPPED | OUTPATIENT
Start: 2023-09-14

## 2023-10-07 DIAGNOSIS — I48.92 PAROXYSMAL ATRIAL FLUTTER (HCC): ICD-10-CM

## 2023-10-09 ENCOUNTER — OFFICE VISIT (OUTPATIENT)
Dept: NEUROLOGY | Age: 83
End: 2023-10-09
Payer: MEDICARE

## 2023-10-09 VITALS
SYSTOLIC BLOOD PRESSURE: 110 MMHG | BODY MASS INDEX: 28.7 KG/M2 | OXYGEN SATURATION: 95 % | HEART RATE: 60 BPM | HEIGHT: 63 IN | DIASTOLIC BLOOD PRESSURE: 65 MMHG | WEIGHT: 162 LBS

## 2023-10-09 DIAGNOSIS — R25.9 MIXED ACTION AND RESTING TREMOR: Primary | ICD-10-CM

## 2023-10-09 PROCEDURE — 1090F PRES/ABSN URINE INCON ASSESS: CPT | Performed by: NURSE PRACTITIONER

## 2023-10-09 PROCEDURE — 99213 OFFICE O/P EST LOW 20 MIN: CPT | Performed by: NURSE PRACTITIONER

## 2023-10-09 PROCEDURE — 3074F SYST BP LT 130 MM HG: CPT | Performed by: NURSE PRACTITIONER

## 2023-10-09 PROCEDURE — G8484 FLU IMMUNIZE NO ADMIN: HCPCS | Performed by: NURSE PRACTITIONER

## 2023-10-09 PROCEDURE — 3078F DIAST BP <80 MM HG: CPT | Performed by: NURSE PRACTITIONER

## 2023-10-09 PROCEDURE — G8417 CALC BMI ABV UP PARAM F/U: HCPCS | Performed by: NURSE PRACTITIONER

## 2023-10-09 PROCEDURE — 1036F TOBACCO NON-USER: CPT | Performed by: NURSE PRACTITIONER

## 2023-10-09 PROCEDURE — G8428 CUR MEDS NOT DOCUMENT: HCPCS | Performed by: NURSE PRACTITIONER

## 2023-10-09 PROCEDURE — 1123F ACP DISCUSS/DSCN MKR DOCD: CPT | Performed by: NURSE PRACTITIONER

## 2023-10-09 PROCEDURE — G8400 PT W/DXA NO RESULTS DOC: HCPCS | Performed by: NURSE PRACTITIONER

## 2023-10-09 RX ORDER — ROPINIROLE 0.25 MG/1
0.25 TABLET, FILM COATED ORAL 2 TIMES DAILY
Qty: 180 TABLET | Refills: 1 | Status: SHIPPED | OUTPATIENT
Start: 2023-10-09

## 2023-10-09 NOTE — PATIENT INSTRUCTIONS
Continue with  Requip 0.25 mg two times a day. Refills given. Call with any new symptoms or concerns.    Follow up in 6 months

## 2023-10-09 NOTE — PROGRESS NOTES
NEUROLOGY OUT PATIENT FOLLOW UP NOTE:  10/9/52243:29 PM    Yariel Ambrocio is here for follow up for mixed action and resting tremor.             Allergies   Allergen Reactions    Latex Itching and Rash    Shellfish-Derived Products Nausea And Vomiting    Amiodarone      Severe headache, throat and tongue swell, and blisters on tongue    Anaprox [Naproxen Sodium]      Not sure of reaction    Avelox [Moxifloxacin Hcl In Nacl]      Confusion and delerium    Biaxin [Clarithromycin]     Bumex [Bumetanide] Other (See Comments)     AFFECTS THE KIDNEYS    Ciprofloxacin Hcl     Codeine      Not sure of reaction    Doxycycline      Dizzy cold sweat syncope    Dyazide [Hydrochlorothiazide W-Triamterene]      Near syncope    Fexofenadine      Not sure of reaction    Fluoxetine      shakes    Hydralazine Nausea Only and Other (See Comments)     RING IN MY EARS    Hyoscyamine      Dizziness and affected eyes    Indocin [Indometacin Sodium]      Not sure of reaction    Mometasone      Nervousness, severe headache, body aches    Morphine Nausea Only     tiredness    Norvasc [Amlodipine Besylate] Swelling     Feet, legs, hand    Omnicef      Vaginal yeast infection    Plavix [Clopidogrel Bisulfate]      bruises    Prednisone Swelling    Promethazine Nausea Only     Stomach cramp    Relafen [Nabumetone] Hives     dizziness    Statins Depletion Therapy      Muscle ache cramp    Triamterene     Amoxicillin Itching and Rash    Cipro Xr Nausea And Vomiting     Stomach pain    Darvocet [Propoxyphene N-Acetaminophen] Itching and Rash    Diflucan [Fluconazole] Nausea And Vomiting     Stomach cramp    Dye [Iodides] Nausea And Vomiting    Hydrocodone Nausea And Vomiting     redness    Niacin And Related Itching and Rash     blisters    Pcn [Penicillins] Rash    Sulfa Antibiotics Itching and Rash    Viroxyn [Benzalkonium Chloride] Nausea Only, Swelling and Rash    Zonalon [Doxepin Hcl] Rash       Current Outpatient Medications:     rOPINIRole

## 2023-10-11 RX ORDER — APIXABAN 5 MG/1
5 TABLET, FILM COATED ORAL 2 TIMES DAILY
Qty: 60 TABLET | Refills: 6 | Status: SHIPPED | OUTPATIENT
Start: 2023-10-11

## 2023-10-26 NOTE — PROGRESS NOTES
[employer] Dual [employer]emaker   Patient of Sam  *Completed by [employer]. [employer]    Battery 2.5 years    Presenting rhythm AP     A Impedance 560  RV Impedance 550    P wave sensing 1.3  R wave sensing -    A Threshold 1.5 @ 0.5  A Amplitude 3.125 @ 0.5 - auto  RV Thresholds 0.75 @ 0.5  RV Amplitude 1 @ 0.5 - auto      A Paced 65%  V Paced 100%    Programmed Mode DDDR       Afib [place] 0%    Episodes   1 AMS episode-brief 6 seconds with controlled vent rate

## 2023-10-31 ENCOUNTER — HOSPITAL ENCOUNTER (OUTPATIENT)
Dept: MAMMOGRAPHY | Age: 83
Discharge: HOME | End: 2023-10-31
Payer: MEDICARE

## 2023-10-31 DIAGNOSIS — Z12.31 VISIT FOR SCREENING MAMMOGRAM: ICD-10-CM

## 2023-10-31 PROCEDURE — 77063 BREAST TOMOSYNTHESIS BI: CPT

## 2024-01-11 RX ORDER — EZETIMIBE 10 MG/1
10 TABLET ORAL DAILY
Qty: 90 TABLET | Refills: 0 | Status: SHIPPED | OUTPATIENT
Start: 2024-01-11

## 2024-01-19 RX ORDER — EZETIMIBE 10 MG/1
10 TABLET ORAL NIGHTLY
Qty: 90 TABLET | Refills: 0 | Status: SHIPPED | OUTPATIENT
Start: 2024-01-19

## 2024-01-25 ENCOUNTER — APPOINTMENT (OUTPATIENT)
Dept: CT IMAGING | Age: 84
DRG: 683 | End: 2024-01-25
Payer: MEDICARE

## 2024-01-25 ENCOUNTER — APPOINTMENT (OUTPATIENT)
Dept: GENERAL RADIOLOGY | Age: 84
DRG: 683 | End: 2024-01-25
Payer: MEDICARE

## 2024-01-25 ENCOUNTER — HOSPITAL ENCOUNTER (INPATIENT)
Age: 84
LOS: 3 days | Discharge: HOME | DRG: 683 | End: 2024-01-28
Attending: EMERGENCY MEDICINE | Admitting: INTERNAL MEDICINE
Payer: MEDICARE

## 2024-01-25 DIAGNOSIS — I48.92 PAROXYSMAL ATRIAL FLUTTER (HCC): ICD-10-CM

## 2024-01-25 DIAGNOSIS — R55 SYNCOPE AND COLLAPSE: ICD-10-CM

## 2024-01-25 DIAGNOSIS — N17.9 AKI (ACUTE KIDNEY INJURY) (HCC): Primary | ICD-10-CM

## 2024-01-25 LAB
ALBUMIN SERPL BCG-MCNC: 3.9 G/DL (ref 3.5–5.1)
ALP SERPL-CCNC: 50 U/L (ref 38–126)
ALT SERPL W/O P-5'-P-CCNC: 12 U/L (ref 11–66)
ANION GAP SERPL CALC-SCNC: 12 MEQ/L (ref 8–16)
AST SERPL-CCNC: 12 U/L (ref 5–40)
BACTERIA URNS QL MICRO: ABNORMAL /HPF
BASOPHILS ABSOLUTE: 0 THOU/MM3 (ref 0–0.1)
BASOPHILS NFR BLD AUTO: 0.3 %
BILIRUB CONJ SERPL-MCNC: < 0.2 MG/DL (ref 0–0.3)
BILIRUB SERPL-MCNC: 0.3 MG/DL (ref 0.3–1.2)
BILIRUB UR QL STRIP.AUTO: NEGATIVE
BUN SERPL-MCNC: 62 MG/DL (ref 7–22)
CALCIUM SERPL-MCNC: 9.5 MG/DL (ref 8.5–10.5)
CASTS #/AREA URNS LPF: ABNORMAL /LPF
CASTS 2: ABNORMAL /LPF
CHARACTER UR: CLEAR
CHLORIDE SERPL-SCNC: 106 MEQ/L (ref 98–111)
CK SERPL-CCNC: 49 U/L (ref 30–135)
CO2 SERPL-SCNC: 20 MEQ/L (ref 23–33)
COLOR: YELLOW
CREAT SERPL-MCNC: 2.4 MG/DL (ref 0.4–1.2)
CRYSTALS URNS MICRO: ABNORMAL
DEPRECATED RDW RBC AUTO: 42.2 FL (ref 35–45)
EOSINOPHIL NFR BLD AUTO: 1.7 %
EOSINOPHILS ABSOLUTE: 0.2 THOU/MM3 (ref 0–0.4)
EPITHELIAL CELLS, UA: ABNORMAL /HPF
ERYTHROCYTE [DISTWIDTH] IN BLOOD BY AUTOMATED COUNT: 12.1 % (ref 11.5–14.5)
GFR SERPL CREATININE-BSD FRML MDRD: 19 ML/MIN/1.73M2
GLUCOSE SERPL-MCNC: 159 MG/DL (ref 70–108)
GLUCOSE UR QL STRIP.AUTO: NEGATIVE MG/DL
HCT VFR BLD AUTO: 36 % (ref 37–47)
HGB BLD-MCNC: 11.6 GM/DL (ref 12–16)
HGB UR QL STRIP.AUTO: NEGATIVE
IMM GRANULOCYTES # BLD AUTO: 0.04 THOU/MM3 (ref 0–0.07)
IMM GRANULOCYTES NFR BLD AUTO: 0.4 %
KETONES UR QL STRIP.AUTO: NEGATIVE
LACTATE SERPL-SCNC: 1.6 MMOL/L (ref 0.5–2)
LYMPHOCYTES ABSOLUTE: 0.9 THOU/MM3 (ref 1–4.8)
LYMPHOCYTES NFR BLD AUTO: 9.5 %
MAGNESIUM SERPL-MCNC: 1.8 MG/DL (ref 1.6–2.4)
MCH RBC QN AUTO: 30.9 PG (ref 26–33)
MCHC RBC AUTO-ENTMCNC: 32.2 GM/DL (ref 32.2–35.5)
MCV RBC AUTO: 96 FL (ref 81–99)
MISCELLANEOUS 2: ABNORMAL
MONOCYTES ABSOLUTE: 0.4 THOU/MM3 (ref 0.4–1.3)
MONOCYTES NFR BLD AUTO: 4.8 %
NEUTROPHILS NFR BLD AUTO: 83.3 %
NITRITE UR QL STRIP: NEGATIVE
NRBC BLD AUTO-RTO: 0 /100 WBC
NT-PROBNP SERPL IA-MCNC: 703 PG/ML (ref 0–449)
OSMOLALITY SERPL CALC.SUM OF ELEC: 296.7 MOSMOL/KG (ref 275–300)
PH UR STRIP.AUTO: 5.5 [PH] (ref 5–9)
PLATELET # BLD AUTO: 143 THOU/MM3 (ref 130–400)
PMV BLD AUTO: 10.5 FL (ref 9.4–12.4)
POTASSIUM SERPL-SCNC: 5 MEQ/L (ref 3.5–5.2)
PROLACTIN SERPL-MCNC: 16.6 NG/ML
PROT SERPL-MCNC: 6.6 G/DL (ref 6.1–8)
PROT UR STRIP.AUTO-MCNC: NEGATIVE MG/DL
RBC # BLD AUTO: 3.75 MILL/MM3 (ref 4.2–5.4)
RBC URINE: ABNORMAL /HPF
REASON FOR REJECTION: NORMAL
REJECTED TEST: NORMAL
RENAL EPI CELLS #/AREA URNS HPF: ABNORMAL /[HPF]
SEGMENTED NEUTROPHILS ABSOLUTE COUNT: 7.5 THOU/MM3 (ref 1.8–7.7)
SODIUM SERPL-SCNC: 138 MEQ/L (ref 135–145)
SP GR UR REFRACT.AUTO: 1.01 (ref 1–1.03)
TROPONIN, HIGH SENSITIVITY: 16 NG/L (ref 0–12)
TROPONIN, HIGH SENSITIVITY: 20 NG/L (ref 0–12)
UROBILINOGEN, URINE: 0.2 EU/DL (ref 0–1)
WBC # BLD AUTO: 9 THOU/MM3 (ref 4.8–10.8)
WBC #/AREA URNS HPF: ABNORMAL /HPF
WBC #/AREA URNS HPF: ABNORMAL /[HPF]
YEAST LIKE FUNGI URNS QL MICRO: ABNORMAL

## 2024-01-25 PROCEDURE — 6370000000 HC RX 637 (ALT 250 FOR IP): Performed by: STUDENT IN AN ORGANIZED HEALTH CARE EDUCATION/TRAINING PROGRAM

## 2024-01-25 PROCEDURE — 71045 X-RAY EXAM CHEST 1 VIEW: CPT

## 2024-01-25 PROCEDURE — 83735 ASSAY OF MAGNESIUM: CPT

## 2024-01-25 PROCEDURE — 36415 COLL VENOUS BLD VENIPUNCTURE: CPT

## 2024-01-25 PROCEDURE — 81001 URINALYSIS AUTO W/SCOPE: CPT

## 2024-01-25 PROCEDURE — 1200000003 HC TELEMETRY R&B

## 2024-01-25 PROCEDURE — 99285 EMERGENCY DEPT VISIT HI MDM: CPT

## 2024-01-25 PROCEDURE — 80053 COMPREHEN METABOLIC PANEL: CPT

## 2024-01-25 PROCEDURE — 2580000003 HC RX 258

## 2024-01-25 PROCEDURE — 83880 ASSAY OF NATRIURETIC PEPTIDE: CPT

## 2024-01-25 PROCEDURE — 99223 1ST HOSP IP/OBS HIGH 75: CPT | Performed by: STUDENT IN AN ORGANIZED HEALTH CARE EDUCATION/TRAINING PROGRAM

## 2024-01-25 PROCEDURE — 84133 ASSAY OF URINE POTASSIUM: CPT

## 2024-01-25 PROCEDURE — 6370000000 HC RX 637 (ALT 250 FOR IP)

## 2024-01-25 PROCEDURE — 82550 ASSAY OF CK (CPK): CPT

## 2024-01-25 PROCEDURE — 82436 ASSAY OF URINE CHLORIDE: CPT

## 2024-01-25 PROCEDURE — 84484 ASSAY OF TROPONIN QUANT: CPT

## 2024-01-25 PROCEDURE — 85025 COMPLETE CBC W/AUTO DIFF WBC: CPT

## 2024-01-25 PROCEDURE — 82248 BILIRUBIN DIRECT: CPT

## 2024-01-25 PROCEDURE — 84540 ASSAY OF URINE/UREA-N: CPT

## 2024-01-25 PROCEDURE — 84146 ASSAY OF PROLACTIN: CPT

## 2024-01-25 PROCEDURE — 84300 ASSAY OF URINE SODIUM: CPT

## 2024-01-25 PROCEDURE — 93005 ELECTROCARDIOGRAM TRACING: CPT | Performed by: EMERGENCY MEDICINE

## 2024-01-25 PROCEDURE — 83605 ASSAY OF LACTIC ACID: CPT

## 2024-01-25 PROCEDURE — 82570 ASSAY OF URINE CREATININE: CPT

## 2024-01-25 PROCEDURE — 70450 CT HEAD/BRAIN W/O DYE: CPT

## 2024-01-25 RX ORDER — POLYETHYLENE GLYCOL 3350 17 G/17G
17 POWDER, FOR SOLUTION ORAL DAILY PRN
Status: DISCONTINUED | OUTPATIENT
Start: 2024-01-25 | End: 2024-01-28 | Stop reason: HOSPADM

## 2024-01-25 RX ORDER — SODIUM CHLORIDE 0.9 % (FLUSH) 0.9 %
10 SYRINGE (ML) INJECTION PRN
Status: DISCONTINUED | OUTPATIENT
Start: 2024-01-25 | End: 2024-01-28 | Stop reason: HOSPADM

## 2024-01-25 RX ORDER — METOPROLOL SUCCINATE 50 MG/1
50 TABLET, EXTENDED RELEASE ORAL DAILY
Qty: 30 TABLET | Refills: 3 | OUTPATIENT
Start: 2024-01-25

## 2024-01-25 RX ORDER — ENOXAPARIN SODIUM 100 MG/ML
30 INJECTION SUBCUTANEOUS DAILY
Status: CANCELLED | OUTPATIENT
Start: 2024-01-25

## 2024-01-25 RX ORDER — ONDANSETRON 2 MG/ML
4 INJECTION INTRAMUSCULAR; INTRAVENOUS EVERY 6 HOURS PRN
Status: DISCONTINUED | OUTPATIENT
Start: 2024-01-25 | End: 2024-01-28 | Stop reason: HOSPADM

## 2024-01-25 RX ORDER — SODIUM CHLORIDE, SODIUM LACTATE, POTASSIUM CHLORIDE, AND CALCIUM CHLORIDE .6; .31; .03; .02 G/100ML; G/100ML; G/100ML; G/100ML
500 INJECTION, SOLUTION INTRAVENOUS ONCE
Status: COMPLETED | OUTPATIENT
Start: 2024-01-25 | End: 2024-01-25

## 2024-01-25 RX ORDER — ACETAMINOPHEN 650 MG/1
650 SUPPOSITORY RECTAL EVERY 6 HOURS PRN
Status: CANCELLED | OUTPATIENT
Start: 2024-01-25

## 2024-01-25 RX ORDER — ISOSORBIDE MONONITRATE 60 MG/1
60 TABLET, EXTENDED RELEASE ORAL 2 TIMES DAILY
Status: DISCONTINUED | OUTPATIENT
Start: 2024-01-25 | End: 2024-01-28 | Stop reason: HOSPADM

## 2024-01-25 RX ORDER — ROPINIROLE 0.25 MG/1
0.25 TABLET, FILM COATED ORAL 2 TIMES DAILY
Status: DISCONTINUED | OUTPATIENT
Start: 2024-01-25 | End: 2024-01-25

## 2024-01-25 RX ORDER — LABETALOL 200 MG/1
200 TABLET, FILM COATED ORAL 2 TIMES DAILY
Status: DISCONTINUED | OUTPATIENT
Start: 2024-01-25 | End: 2024-01-28 | Stop reason: HOSPADM

## 2024-01-25 RX ORDER — SODIUM CHLORIDE 9 MG/ML
INJECTION, SOLUTION INTRAVENOUS PRN
Status: DISCONTINUED | OUTPATIENT
Start: 2024-01-25 | End: 2024-01-28 | Stop reason: HOSPADM

## 2024-01-25 RX ORDER — ROPINIROLE 0.25 MG/1
0.25 TABLET, FILM COATED ORAL 2 TIMES DAILY
Status: DISCONTINUED | OUTPATIENT
Start: 2024-01-25 | End: 2024-01-28 | Stop reason: HOSPADM

## 2024-01-25 RX ORDER — ONDANSETRON 4 MG/1
4 TABLET, ORALLY DISINTEGRATING ORAL EVERY 8 HOURS PRN
Status: DISCONTINUED | OUTPATIENT
Start: 2024-01-25 | End: 2024-01-28 | Stop reason: HOSPADM

## 2024-01-25 RX ORDER — SODIUM CHLORIDE 0.9 % (FLUSH) 0.9 %
10 SYRINGE (ML) INJECTION EVERY 12 HOURS SCHEDULED
Status: DISCONTINUED | OUTPATIENT
Start: 2024-01-25 | End: 2024-01-28 | Stop reason: HOSPADM

## 2024-01-25 RX ORDER — CLONIDINE HYDROCHLORIDE 0.1 MG/1
0.1 TABLET ORAL 2 TIMES DAILY PRN
Status: DISCONTINUED | OUTPATIENT
Start: 2024-01-25 | End: 2024-01-28 | Stop reason: HOSPADM

## 2024-01-25 RX ORDER — EZETIMIBE 10 MG/1
10 TABLET ORAL NIGHTLY
Status: DISCONTINUED | OUTPATIENT
Start: 2024-01-25 | End: 2024-01-28 | Stop reason: HOSPADM

## 2024-01-25 RX ORDER — ACETAMINOPHEN 325 MG/1
650 TABLET ORAL EVERY 6 HOURS PRN
Status: CANCELLED | OUTPATIENT
Start: 2024-01-25

## 2024-01-25 RX ADMIN — ROPINIROLE HYDROCHLORIDE 0.25 MG: 0.25 TABLET, FILM COATED ORAL at 18:23

## 2024-01-25 RX ADMIN — SODIUM CHLORIDE, PRESERVATIVE FREE 10 ML: 5 INJECTION INTRAVENOUS at 20:47

## 2024-01-25 RX ADMIN — EZETIMIBE 10 MG: 10 TABLET ORAL at 20:48

## 2024-01-25 RX ADMIN — APIXABAN 2.5 MG: 2.5 TABLET, FILM COATED ORAL at 20:48

## 2024-01-25 RX ADMIN — SODIUM CHLORIDE, POTASSIUM CHLORIDE, SODIUM LACTATE AND CALCIUM CHLORIDE 500 ML: 600; 310; 30; 20 INJECTION, SOLUTION INTRAVENOUS at 17:13

## 2024-01-25 ASSESSMENT — PAIN - FUNCTIONAL ASSESSMENT
PAIN_FUNCTIONAL_ASSESSMENT: NONE - DENIES PAIN

## 2024-01-25 ASSESSMENT — PAIN SCALES - GENERAL
PAINLEVEL_OUTOF10: 0
PAINLEVEL_OUTOF10: 0

## 2024-01-25 NOTE — H&P
transcribed. The note may contain errors not detected in proofreading. Please refer to my supervising physician's documentation if my documentation differs.

## 2024-01-25 NOTE — ED NOTES
Patient transported to [facility] on cart and in stable condition. Contacted floor before transport, and spoke with Jacinta.

## 2024-01-25 NOTE — ED PROVIDER NOTES
[facility] EMERGENCY DEPARTMENT    EMERGENCY MEDICINE     Patient Name: Joanie Duke  MRN: [id number]  YOB: 1940  Date of Evaluation: 1/25/2024  Treating Resident Physician: Isreal Murrieta DO  Supervising Physician: Dr. Julian Rivera MD    CHIEF COMPLAINT       Chief Complaint   Patient presents with   • Loss of Consciousness       HISTORY OF PRESENT ILLNESS    HPI    History obtained from child, chart review, and the patient.    Joanie Duke is a 83 y.o. female who presents to the emergency department from assisted living facility brought in by EMS for evaluation of loss of consciousness.  Patient was at her nursing facility eating her yogurt about to play bingo when a co-resident noticed her go limp with her eyes rolled back.  She never fell down or hit her head.  Individual tried to the Heimlich maneuver while she was sitting in the chair and noted yellow foam at her mouth.  Onlookers stated that she was choking.  The color of her yogurt was pink and not yellow.  She regained consciousness at about 3 to 4 minutes later.  When EMS was called and taken to the ED.  Patient has visible vomit noted on her shirt.  Patient states she does not remember the event but does recollect being nauseous prior to her losing consciousness.  She otherwise denies any headaches shortness of breath blurry vision chest pain abdominal pain urinary symptoms including dysuria or increased urinary frequency muscle pain or joint pains.    Pertinent previous and/or external records reviewed[id number]\"***\"}    REVIEW OF SYSTEMS   Review of Systems  Negative unless documented in HPI    PAST MEDICAL AND SURGICAL HISTORY     Past Medical History:   Diagnosis Date   • Arthritis    • CAD (coronary artery disease)    • Hyperlipidemia    • Hypertension    • Ovarian cyst     right       Past Surgical History:   Procedure Laterality Date   • CARDIAC CATHETERIZATION  01/01/2007   • CHOLECYSTECTOMY  01/01/1986   •

## 2024-01-25 NOTE — ED NOTES
Pt presents to the ED from playing with friend when patient has unresponsive episode and emesis on self. Pt does not remember thee incident. Pt arrived to the ED alert and oriented times 4. Pt denies any complaints.  per EMS.

## 2024-01-25 NOTE — ED NOTES
ED to inpatient nurses report      Chief Complaint:  Chief Complaint   Patient presents with    Loss of Consciousness     Present to ED from: home    MOA:     LOC: alert and orientated to name, place, date  Mobility: Requires assistance * 1  Oxygen Baseline: RA    Current needs required: RA     Code Status:   Prior    What abnormal results were found and what did you give/do to treat them? none  Any procedures or intervention occur? none    Mental Status:  Level of Consciousness: Alert (0)    Psych Assessment:        Vitals:  Patient Vitals for the past 24 hrs:   BP Temp Temp src Pulse Resp SpO2 Weight   01/25/24 1259 (!) 118/47 -- -- 60 14 95 % --   01/25/24 1214 -- -- -- 65 16 97 % --   01/25/24 1115 (!) 115/43 97.6 °F (36.4 °C) Oral 60 16 95 % 73.5 kg (162 lb)        LDAs:   Peripheral IV 01/25/24 Left Antecubital (Active)   Site Assessment Clean, dry & intact 01/25/24 1121   Line Status Blood return noted;Flushed 01/25/24 1121       Ambulatory Status:  No data recorded    Diagnosis:  DISPOSITION Admitted 01/25/2024 01:41:21 PM   Final diagnoses:   ERIN (acute kidney injury) ([facility])        Consults:  None     Pain Score:  Pain Assessment  Pain Assessment: None - Denies Pain    C-SSRS:        Sepsis Screening:  Sepsis Risk Score: 1.93    [place] Fall Risk:       Swallow Screening        Preferred Language:   English      ALLERGIES     Latex, Shellfish-derived products, Amiodarone, Anaprox [naproxen sodium], Avelox [moxifloxacin hcl in nacl], Biaxin [clarithromycin], Bumex [bumetanide], Ciprofloxacin hcl, Codeine, Doxycycline, Dyazide [hydrochlorothiazide w-triamterene], Fexofenadine, Fluoxetine, Hydralazine, Hyoscyamine, Indocin [indometacin sodium], Mometasone, Morphine, Norvasc [amlodipine besylate], Omnicef, Plavix [clopidogrel bisulfate], Prednisone, Promethazine, Relafen [nabumetone], Statins depletion therapy, Triamterene, Amoxicillin, Cipro xr, Darvocet [propoxyphene n-acetaminophen], Diflucan [fluconazole],

## 2024-01-25 NOTE — PLAN OF CARE
Problem: Discharge Planning  Goal: Discharge to home or other facility with appropriate resources  Outcome: Progressing  Flowsheets (Taken 1/25/2024 1833)  Discharge to home or other facility with appropriate resources:   Identify barriers to discharge with patient and caregiver   Arrange for needed discharge resources and transportation as appropriate   Identify discharge learning needs (meds, wound care, etc)   Arrange for interpreters to assist at discharge as needed   Refer to discharge planning if patient needs post-hospital services based on physician order or complex needs related to functional status, cognitive ability or social support system     Problem: ABCDS Injury Assessment  Goal: Absence of physical injury  Outcome: Progressing  Flowsheets (Taken 1/25/2024 1833)  Absence of Physical Injury: Implement safety measures based on patient assessment     Problem: Safety - Adult  Goal: Free from fall injury  Outcome: Progressing  Flowsheets (Taken 1/25/2024 1833)  Free From Fall Injury:   Instruct family/caregiver on patient safety   Based on caregiver fall risk screen, instruct family/caregiver to ask for assistance with transferring infant if caregiver noted to have fall risk factors     Problem: Neurosensory - Adult  Goal: Achieves maximal functionality and self care  Outcome: Progressing  Flowsheets (Taken 1/25/2024 1833)  Achieves maximal functionality and self care:   Monitor swallowing and airway patency with patient fatigue and changes in neurological status   Encourage and assist patient to increase activity and self care with guidance from physical therapy/occupational therapy   Encourage visually impaired, hearing impaired and aphasic patients to use assistive/communication devices     Problem: Cardiovascular - Adult  Goal: Maintains optimal cardiac output and hemodynamic stability  Outcome: Progressing  Flowsheets (Taken 1/25/2024 1833)  Maintains optimal cardiac output and hemodynamic

## 2024-01-25 NOTE — TELEPHONE ENCOUNTER
Joanie Duke called requesting a refill on the following medications:  Requested Prescriptions     Pending Prescriptions Disp Refills    metoprolol succinate (TOPROL XL) 50 MG extended release tablet 30 tablet 3     Sig: Take 1 tablet by mouth daily     Pharmacy verified:[facility] [address]  .[address]      Date of last visit: 3/10/23  Date of next visit (if applicable): 3/14/2024

## 2024-01-25 NOTE — ED PROVIDER NOTES
PATIENT NAME: Joanie Duke  MRN: [id number]  : 1940  GLASER: 2024    I performed a history and physical examination of the patient and discussed management with the Resident. I reviewed the Resident's note and agree with the documented findings and plan of care. Any areas of disagreement are noted on the chart. I was personally present for the key portions of any procedures and have documented in the chart those procedures where I was not present during the key portions. I have reviewed the emergency nurses triage note and agree with the chief complaint, past medical history, past surgical history, allergies, medications, social and family history as documented unless otherwise noted below.    MEDICAL DEDISION MAKING (MDM)     Joanie Duke is a 83 y.o. female who presents to Emergency Department with Loss of Consciousness     Passed out at the [facility]. No head or neck injury.   Exam: AVSS.  Heart and lungs are unremarkable.  Soft abdomen without tenderness.  Neurologically intact.  EKG interpreted by me as AV dual paced rhythm, prolonged AV conduction, ventricular rate 60 bpm, CO interval 228 ms, QRS duration 142 ms,  ms  ED workups reveal ERIN.  Admission is warranted due to her age, syncope, and ERIN.  Discussed with and admitted by hospital medicine service    Vitals:    24 1115 24 1214 24 1259 24 1356   BP: (!) 115/43  (!) 118/47 (!) 112/54   Pulse: 60 65 60 60   Resp: 16 16 14 20   Temp: 97.6 °F (36.4 °C)      TempSrc: Oral      SpO2: 95% 97% 95% 94%   Weight: 73.5 kg (162 lb)        Labs Reviewed   CBC WITH AUTO DIFFERENTIAL - Abnormal; Notable for the following components:       Result Value    RBC 3.75 (*)     Hemoglobin 11.6 (*)     Hematocrit 36.0 (*)     Lymphocytes Absolute 0.9 (*)     All other components within normal limits   BASIC METABOLIC PANEL - Abnormal; Notable for the following components:    CO2 20 (*)     Glucose 159 (*)     BUN 62 (*)     Creatinine

## 2024-01-26 ENCOUNTER — APPOINTMENT (OUTPATIENT)
Age: 84
DRG: 683 | End: 2024-01-26
Payer: MEDICARE

## 2024-01-26 PROBLEM — R55 SYNCOPE AND COLLAPSE: Status: ACTIVE | Noted: 2024-01-26

## 2024-01-26 LAB
ANION GAP SERPL CALC-SCNC: 13 MEQ/L (ref 8–16)
BUN SERPL-MCNC: 48 MG/DL (ref 7–22)
CALCIUM SERPL-MCNC: 9.4 MG/DL (ref 8.5–10.5)
CHLORIDE 24H UR-SRATE: 27 MEQ/L
CHLORIDE SERPL-SCNC: 109 MEQ/L (ref 98–111)
CO2 SERPL-SCNC: 20 MEQ/L (ref 23–33)
CREAT SERPL-MCNC: 2 MG/DL (ref 0.4–1.2)
CREAT UR-MCNC: 69.1 MG/DL
DEPRECATED RDW RBC AUTO: 42.2 FL (ref 35–45)
ECHO AO ASC DIAM: 3.2 CM
ECHO AO ASCENDING AORTA INDEX: 1.81 CM/M2
ECHO AV CUSP MM: 2.1 CM
ECHO AV PEAK GRADIENT: 7 MMHG
ECHO AV PEAK VELOCITY: 1.3 M/S
ECHO AV VELOCITY RATIO: 0.62
ECHO BSA: 1.81 M2
ECHO EST RA PRESSURE: 5 MMHG
ECHO IVC PROX: 1.7 CM
ECHO LA AREA 2C: 17.9 CM2
ECHO LA AREA 4C: 15.6 CM2
ECHO LA DIAMETER INDEX: 2.09 CM/M2
ECHO LA DIAMETER: 3.7 CM
ECHO LA MAJOR AXIS: 4.8 CM
ECHO LA MINOR AXIS: 5.7 CM
ECHO LA VOL BP: 48 ML (ref 22–52)
ECHO LA VOL MOD A2C: 46 ML (ref 22–52)
ECHO LA VOL MOD A4C: 43 ML (ref 22–52)
ECHO LA VOL/BSA BIPLANE: 27 ML/M2 (ref 16–34)
ECHO LA VOLUME INDEX MOD A2C: 26 ML/M2 (ref 16–34)
ECHO LA VOLUME INDEX MOD A4C: 24 ML/M2 (ref 16–34)
ECHO LV E' LATERAL VELOCITY: 9 CM/S
ECHO LV E' SEPTAL VELOCITY: 6 CM/S
ECHO LV FRACTIONAL SHORTENING: 33 % (ref 28–44)
ECHO LV INTERNAL DIMENSION DIASTOLE INDEX: 2.03 CM/M2
ECHO LV INTERNAL DIMENSION DIASTOLIC: 3.6 CM (ref 3.9–5.3)
ECHO LV INTERNAL DIMENSION SYSTOLIC INDEX: 1.36 CM/M2
ECHO LV INTERNAL DIMENSION SYSTOLIC: 2.4 CM
ECHO LV ISOVOLUMETRIC RELAXATION TIME (IVRT): 106 MS
ECHO LV IVSD: 1.3 CM (ref 0.6–0.9)
ECHO LV MASS 2D: 150.6 G (ref 67–162)
ECHO LV MASS INDEX 2D: 85.1 G/M2 (ref 43–95)
ECHO LV POSTERIOR WALL DIASTOLIC: 1.2 CM (ref 0.6–0.9)
ECHO LV RELATIVE WALL THICKNESS RATIO: 0.67
ECHO LVOT PEAK GRADIENT: 3 MMHG
ECHO LVOT PEAK VELOCITY: 0.8 M/S
ECHO MV A VELOCITY: 1.55 M/S
ECHO MV E DECELERATION TIME (DT): 437 MS
ECHO MV E VELOCITY: 1.32 M/S
ECHO MV E/A RATIO: 0.85
ECHO MV E/E' LATERAL: 14.67
ECHO MV E/E' RATIO (AVERAGED): 18.33
ECHO MV REGURGITANT PEAK GRADIENT: 100 MMHG
ECHO MV REGURGITANT PEAK VELOCITY: 5 M/S
ECHO PULMONARY ARTERY END DIASTOLIC PRESSURE: 5 MMHG
ECHO PV MAX VELOCITY: 0.8 M/S
ECHO PV PEAK GRADIENT: 2 MMHG
ECHO PV REGURGITANT MAX VELOCITY: 1.1 M/S
ECHO RIGHT VENTRICULAR SYSTOLIC PRESSURE (RVSP): 34 MMHG
ECHO RV INTERNAL DIMENSION: 2.9 CM
ECHO RV TAPSE: 2.2 CM (ref 1.7–?)
ECHO TV E WAVE: 0.9 M/S
ECHO TV REGURGITANT MAX VELOCITY: 2.7 M/S
ECHO TV REGURGITANT PEAK GRADIENT: 29 MMHG
ERYTHROCYTE [DISTWIDTH] IN BLOOD BY AUTOMATED COUNT: 12.1 % (ref 11.5–14.5)
GFR SERPL CREATININE-BSD FRML MDRD: 24 ML/MIN/1.73M2
GLUCOSE SERPL-MCNC: 95 MG/DL (ref 70–108)
HCT VFR BLD AUTO: 35.4 % (ref 37–47)
HGB BLD-MCNC: 11.4 GM/DL (ref 12–16)
MCH RBC QN AUTO: 31 PG (ref 26–33)
MCHC RBC AUTO-ENTMCNC: 32.2 GM/DL (ref 32.2–35.5)
MCV RBC AUTO: 96.2 FL (ref 81–99)
PLATELET # BLD AUTO: 138 THOU/MM3 (ref 130–400)
PMV BLD AUTO: 10.6 FL (ref 9.4–12.4)
POTASSIUM SERPL-SCNC: 5 MEQ/L (ref 3.5–5.2)
POTASSIUM UR-SCNC: 24.2 MEQ/L
RBC # BLD AUTO: 3.68 MILL/MM3 (ref 4.2–5.4)
SODIUM SERPL-SCNC: 142 MEQ/L (ref 135–145)
SODIUM UR-SCNC: 39 MEQ/L
T4 FREE SERPL-MCNC: 1.35 NG/DL (ref 0.93–1.76)
TSH SERPL DL<=0.005 MIU/L-ACNC: 3.8 UIU/ML (ref 0.4–4.2)
UUN 24H UR-MCNC: 657 MG/DL
WBC # BLD AUTO: 6 THOU/MM3 (ref 4.8–10.8)

## 2024-01-26 PROCEDURE — 99232 SBSQ HOSP IP/OBS MODERATE 35: CPT | Performed by: INTERNAL MEDICINE

## 2024-01-26 PROCEDURE — 1200000003 HC TELEMETRY R&B

## 2024-01-26 PROCEDURE — 84439 ASSAY OF FREE THYROXINE: CPT

## 2024-01-26 PROCEDURE — 2580000003 HC RX 258: Performed by: INTERNAL MEDICINE

## 2024-01-26 PROCEDURE — 93306 TTE W/DOPPLER COMPLETE: CPT

## 2024-01-26 PROCEDURE — 93306 TTE W/DOPPLER COMPLETE: CPT | Performed by: NUCLEAR MEDICINE

## 2024-01-26 PROCEDURE — 93010 ELECTROCARDIOGRAM REPORT: CPT | Performed by: INTERNAL MEDICINE

## 2024-01-26 PROCEDURE — 84443 ASSAY THYROID STIM HORMONE: CPT

## 2024-01-26 PROCEDURE — 2580000003 HC RX 258

## 2024-01-26 PROCEDURE — 6370000000 HC RX 637 (ALT 250 FOR IP)

## 2024-01-26 PROCEDURE — 85027 COMPLETE CBC AUTOMATED: CPT

## 2024-01-26 PROCEDURE — 36415 COLL VENOUS BLD VENIPUNCTURE: CPT

## 2024-01-26 PROCEDURE — 80048 BASIC METABOLIC PNL TOTAL CA: CPT

## 2024-01-26 PROCEDURE — 6370000000 HC RX 637 (ALT 250 FOR IP): Performed by: STUDENT IN AN ORGANIZED HEALTH CARE EDUCATION/TRAINING PROGRAM

## 2024-01-26 RX ORDER — SODIUM CHLORIDE 9 MG/ML
INJECTION, SOLUTION INTRAVENOUS CONTINUOUS
Status: DISCONTINUED | OUTPATIENT
Start: 2024-01-26 | End: 2024-01-27

## 2024-01-26 RX ADMIN — SODIUM CHLORIDE: 9 INJECTION, SOLUTION INTRAVENOUS at 12:19

## 2024-01-26 RX ADMIN — EZETIMIBE 10 MG: 10 TABLET ORAL at 20:42

## 2024-01-26 RX ADMIN — APIXABAN 2.5 MG: 2.5 TABLET, FILM COATED ORAL at 20:42

## 2024-01-26 RX ADMIN — APIXABAN 2.5 MG: 2.5 TABLET, FILM COATED ORAL at 10:20

## 2024-01-26 RX ADMIN — ROPINIROLE HYDROCHLORIDE 0.25 MG: 0.25 TABLET, FILM COATED ORAL at 08:44

## 2024-01-26 RX ADMIN — SODIUM CHLORIDE, PRESERVATIVE FREE 10 ML: 5 INJECTION INTRAVENOUS at 08:44

## 2024-01-26 RX ADMIN — LABETALOL HYDROCHLORIDE 200 MG: 200 TABLET, FILM COATED ORAL at 08:44

## 2024-01-26 RX ADMIN — ROPINIROLE HYDROCHLORIDE 0.25 MG: 0.25 TABLET, FILM COATED ORAL at 20:42

## 2024-01-26 RX ADMIN — LABETALOL HYDROCHLORIDE 200 MG: 200 TABLET, FILM COATED ORAL at 20:42

## 2024-01-26 ASSESSMENT — PAIN SCALES - GENERAL
PAINLEVEL_OUTOF10: 0

## 2024-01-26 NOTE — PROCEDURES
Pacer Interrogate Completed.     The report will be sent to the floor as soon as possible.     [address]   Pacemaker [id number]

## 2024-01-26 NOTE — PLAN OF CARE
Problem: Discharge Planning  Goal: Discharge to home or other facility with appropriate resources  Outcome: Progressing  Flowsheets (Taken 1/25/2024 1833)  Discharge to home or other facility with appropriate resources:   Identify barriers to discharge with patient and caregiver   Arrange for needed discharge resources and transportation as appropriate   Identify discharge learning needs (meds, wound care, etc)   Arrange for interpreters to assist at discharge as needed   Refer to discharge planning if patient needs post-hospital services based on physician order or complex needs related to functional status, cognitive ability or social support system     Problem: ABCDS Injury Assessment  Goal: Absence of physical injury  Outcome: Progressing  Flowsheets (Taken 1/26/2024 1809)  Absence of Physical Injury: Implement safety measures based on patient assessment     Problem: Safety - Adult  Goal: Free from fall injury  Outcome: Progressing  Flowsheets (Taken 1/25/2024 1833)  Free From Fall Injury:   Instruct family/caregiver on patient safety   Based on caregiver fall risk screen, instruct family/caregiver to ask for assistance with transferring infant if caregiver noted to have fall risk factors     Problem: Neurosensory - Adult  Goal: Achieves maximal functionality and self care  Outcome: Progressing  Flowsheets (Taken 1/25/2024 1833)  Achieves maximal functionality and self care:   Monitor swallowing and airway patency with patient fatigue and changes in neurological status   Encourage and assist patient to increase activity and self care with guidance from physical therapy/occupational therapy   Encourage visually impaired, hearing impaired and aphasic patients to use assistive/communication devices     Problem: Cardiovascular - Adult  Goal: Maintains optimal cardiac output and hemodynamic stability  Outcome: Progressing  Flowsheets (Taken 1/25/2024 1833)  Maintains optimal cardiac output and hemodynamic

## 2024-01-26 NOTE — CONSULTS
participate in the care of this patient.  Please do not hesitate to call us with questions.    Time spent reviewing notes, data, discussing with patient/family, and formulating plan with clinical documentation was approximately 80 minutes.     Electronically signed by Miguel Woodson DO on 1/26/2024 at 9:28 AM  Patient evaluation and management staffed with Dr. Neda Ramirez MD.      Interventional Cardiology - [facility]    Patient was seen and examined  Discussed with the team and staff on rounds  Patient with syncope likely due to hypotension secondary to hypovolemia   Renal failure possible dehydration   Hold diuretics  Gentle hydration   Check pacer  Check echo  Further to follow  Thank you for allowing me to participate in the care of your patient. Please don't hesitate to contact me regarding any further issues related to the patient care    Neda Ramirez MD

## 2024-01-26 NOTE — CARE COORDINATION
Case Management Assessment  Initial Evaluation    Date/Time of Evaluation: 1/26/2024 10:53 AM  Assessment Completed by: Dafne Mtz RN    If patient is discharged prior to next notation, then this note serves as note for discharge by case management.    Patient Name: Joanie Duke                   YOB: 1940  Diagnosis: ERIN (acute kidney injury) (HCC) [N17.9]                   Date / Time: 1/25/2024 11:07 AM  Location: [address] [place] [address]     Patient Admission Status: Inpatient   Readmission Risk Low 0-14, Mod 15-19), High > 20: Readmission Risk Score: 12.8    Current PCP: William Lindquist MD  PCP verified by CM? Yes    Chart Reviewed: Yes      History Provided by: Patient  Patient Orientation: Alert and Oriented    Patient Cognition: Alert    Hospitalization in the last 30 days (Readmission):  No    If yes, Readmission Assessment in  Navigator will be completed.    Advance Directives:      Code Status: Full Code   Patient's Primary Decision Maker is: Legal Next of Kin      Discharge Planning:    Patient lives with: Alone Type of Home: House  Primary Care Giver: Self  Patient Support Systems include: Children, Family Members   Current Financial resources: Medicare  Current community resources: None  Current services prior to admission: None            Current DME:              Type of Home Care services:  None    ADLS  Prior functional level: Independent in ADLs/IADLs  Current functional level: Independent in ADLs/IADLs    Family can provide assistance at DC: Yes  Would you like Case Management to discuss the discharge plan with any other family members/significant others, and if so, who? No  Plans to Return to Present Housing: Yes  Other Identified Issues/Barriers to RETURNING to current housing: none  Potential Assistance needed at discharge: N/A            Potential DME:    Patient expects to discharge to: House  Plan for transportation at discharge: Self    Financial    Payor: [facility] MEDICARE /

## 2024-01-27 LAB
ANION GAP SERPL CALC-SCNC: 7 MEQ/L (ref 8–16)
BUN SERPL-MCNC: 38 MG/DL (ref 7–22)
CALCIUM SERPL-MCNC: 8.8 MG/DL (ref 8.5–10.5)
CHLORIDE SERPL-SCNC: 112 MEQ/L (ref 98–111)
CO2 SERPL-SCNC: 20 MEQ/L (ref 23–33)
CREAT SERPL-MCNC: 1.7 MG/DL (ref 0.4–1.2)
DEPRECATED RDW RBC AUTO: 43.8 FL (ref 35–45)
ERYTHROCYTE [DISTWIDTH] IN BLOOD BY AUTOMATED COUNT: 12.1 % (ref 11.5–14.5)
GFR SERPL CREATININE-BSD FRML MDRD: 29 ML/MIN/1.73M2
GLUCOSE SERPL-MCNC: 94 MG/DL (ref 70–108)
HCT VFR BLD AUTO: 35 % (ref 37–47)
HGB BLD-MCNC: 11.1 GM/DL (ref 12–16)
MCH RBC QN AUTO: 31 PG (ref 26–33)
MCHC RBC AUTO-ENTMCNC: 31.7 GM/DL (ref 32.2–35.5)
MCV RBC AUTO: 97.8 FL (ref 81–99)
PLATELET # BLD AUTO: 122 THOU/MM3 (ref 130–400)
PMV BLD AUTO: 10.4 FL (ref 9.4–12.4)
POTASSIUM SERPL-SCNC: 5.2 MEQ/L (ref 3.5–5.2)
RBC # BLD AUTO: 3.58 MILL/MM3 (ref 4.2–5.4)
SODIUM SERPL-SCNC: 139 MEQ/L (ref 135–145)
WBC # BLD AUTO: 5.1 THOU/MM3 (ref 4.8–10.8)

## 2024-01-27 PROCEDURE — 99233 SBSQ HOSP IP/OBS HIGH 50: CPT | Performed by: NURSE PRACTITIONER

## 2024-01-27 PROCEDURE — 36415 COLL VENOUS BLD VENIPUNCTURE: CPT

## 2024-01-27 PROCEDURE — 2580000003 HC RX 258: Performed by: INTERNAL MEDICINE

## 2024-01-27 PROCEDURE — 99232 SBSQ HOSP IP/OBS MODERATE 35: CPT | Performed by: REGISTERED NURSE

## 2024-01-27 PROCEDURE — 80048 BASIC METABOLIC PNL TOTAL CA: CPT

## 2024-01-27 PROCEDURE — 85027 COMPLETE CBC AUTOMATED: CPT

## 2024-01-27 PROCEDURE — 6370000000 HC RX 637 (ALT 250 FOR IP)

## 2024-01-27 PROCEDURE — 2580000003 HC RX 258

## 2024-01-27 PROCEDURE — 1200000003 HC TELEMETRY R&B

## 2024-01-27 PROCEDURE — 6370000000 HC RX 637 (ALT 250 FOR IP): Performed by: STUDENT IN AN ORGANIZED HEALTH CARE EDUCATION/TRAINING PROGRAM

## 2024-01-27 RX ADMIN — APIXABAN 2.5 MG: 2.5 TABLET, FILM COATED ORAL at 08:07

## 2024-01-27 RX ADMIN — ROPINIROLE HYDROCHLORIDE 0.25 MG: 0.25 TABLET, FILM COATED ORAL at 08:06

## 2024-01-27 RX ADMIN — APIXABAN 2.5 MG: 2.5 TABLET, FILM COATED ORAL at 20:26

## 2024-01-27 RX ADMIN — LABETALOL HYDROCHLORIDE 200 MG: 200 TABLET, FILM COATED ORAL at 20:26

## 2024-01-27 RX ADMIN — ROPINIROLE HYDROCHLORIDE 0.25 MG: 0.25 TABLET, FILM COATED ORAL at 20:26

## 2024-01-27 RX ADMIN — EZETIMIBE 10 MG: 10 TABLET ORAL at 20:26

## 2024-01-27 RX ADMIN — SODIUM CHLORIDE, PRESERVATIVE FREE 10 ML: 5 INJECTION INTRAVENOUS at 20:26

## 2024-01-27 RX ADMIN — SODIUM CHLORIDE: 9 INJECTION, SOLUTION INTRAVENOUS at 00:49

## 2024-01-27 ASSESSMENT — PAIN SCALES - GENERAL
PAINLEVEL_OUTOF10: 0

## 2024-01-27 NOTE — DISCHARGE INSTRUCTIONS
Follow up with Dr. Landaverde's office (cardiology)  in 1 month. Former Dr. Vargas patient- please have follow with Chaya Trevizo CNP for follow-up in office.     Please make sure you are drinking water    Please check your blood pressure a couple times a day and record the findings to show at your follow-up appointment    Please change positions slowly

## 2024-01-27 NOTE — PLAN OF CARE
Problem: Discharge Planning  Goal: Discharge to home or other facility with appropriate resources  1/27/2024 0441 by Mary Zepeda RN  Outcome: Progressing  Note: Pt will discharge home when appropriate.       Problem: ABCDS Injury Assessment  Goal: Absence of physical injury  1/27/2024 0441 by Mary Zepeda RN  Outcome: Progressing  Note: Absence of physical injury.       Problem: Safety - Adult  Goal: Free from fall injury  1/27/2024 0441 by Mary Zepeda RN  Outcome: Progressing  Note: Fall precaution in place. Bed alarm/chair alarm. Bed locked in lowest position. Fall band applied if applicable. Call light and overhead table within reach. Will continue to monitor.       Problem: Cardiovascular - Adult  Goal: Maintains optimal cardiac output and hemodynamic stability  1/27/2024 0441 by Mary Zepeda RN  Outcome: Progressing  Note: Pt vitals are stable on my shift. Will continue to assess.       Problem: Cardiovascular - Adult  Goal: Absence of cardiac dysrhythmias or at baseline  1/27/2024 0441 by Mary Zepeda RN  Outcome: Progressing  Note: Absence of cardiac dysrhythmias on my shift , will continue to assess.       Problem: Metabolic/Fluid and Electrolytes - Adult  Goal: Electrolytes maintained within normal limits  1/27/2024 0441 by Mary Zepeda RN  Outcome: Progressing  Note: Pt electrolytes within normal limits on my shift. Will continue to monitor.       Problem: Hematologic - Adult  Goal: Maintains hematologic stability  1/27/2024 0441 by Mary Zepeda RN  Outcome: Progressing  Note: Pt shows no signs or symptoms of bleeding. Will continue to assess.       Problem: Skin/Tissue Integrity - Adult  Goal: Skin integrity remains intact  1/27/2024 0441 by Mary Zepeda RN  Outcome: Progressing  Note: Absence of new skin integrity issues on my shift. Will continue to assess.     Pt verbalizes understanding of care plan. Pt contributes to goal settings.

## 2024-01-27 NOTE — PLAN OF CARE
Problem: Discharge Planning  Goal: Discharge to home or other facility with appropriate resources  1/27/2024 1348 by Julia Anderson RN  Outcome: Progressing  Flowsheets (Taken 1/27/2024 1348)  Discharge to home or other facility with appropriate resources: Identify barriers to discharge with patient and caregiver  Note: Patient is from home alone and plans to discharge home. Case management following.     Problem: ABCDS Injury Assessment  Goal: Absence of physical injury  1/27/2024 1348 by Julia Anderson RN  Outcome: Progressing  Flowsheets (Taken 1/27/2024 1348)  Absence of Physical Injury: Implement safety measures based on patient assessment  Note: Patient free from falls in hospital. No pressure injuries noted.      Problem: Safety - Adult  Goal: Free from fall injury  1/27/2024 1348 by Julia Anderson RN  Outcome: Progressing  Flowsheets (Taken 1/27/2024 1348)  Free From Fall Injury: Instruct family/caregiver on patient safety  Note: Patient with syncopal episode while at [facility]. No injuries. Fall precautions in place. Patient uses call light to ask for assistance.      Problem: Cardiovascular - Adult  Goal: Maintains optimal cardiac output and hemodynamic stability  1/27/2024 1348 by Julia Anderson RN  Outcome: Progressing  Flowsheets (Taken 1/27/2024 1348)  Maintains optimal cardiac output and hemodynamic stability: Monitor blood pressure and heart rate  Note: Vital signs stable. Patient denies chest pain. Telemetry monitor continued.      Problem: Cardiovascular - Adult  Goal: Absence of cardiac dysrhythmias or at baseline  1/27/2024 1348 by Julia Anderson RN  Outcome: Progressing  Flowsheets (Taken 1/27/2024 1348)  Absence of cardiac dysrhythmias or at baseline: Monitor cardiac rate and rhythm  Note: Telemetry monitor continues. AV Paced. No arrhythmias.      Problem: Musculoskeletal - Adult  Goal: Return ADL status to a safe level of function  1/27/2024 1348 by Julia Anderson RN  Outcome:

## 2024-01-28 VITALS
HEART RATE: 60 BPM | OXYGEN SATURATION: 97 % | RESPIRATION RATE: 16 BRPM | DIASTOLIC BLOOD PRESSURE: 56 MMHG | TEMPERATURE: 97.9 F | HEIGHT: 63 IN | WEIGHT: 172.4 LBS | BODY MASS INDEX: 30.55 KG/M2 | SYSTOLIC BLOOD PRESSURE: 168 MMHG

## 2024-01-28 LAB
ANION GAP SERPL CALC-SCNC: 9 MEQ/L (ref 8–16)
BUN SERPL-MCNC: 33 MG/DL (ref 7–22)
CALCIUM SERPL-MCNC: 8.7 MG/DL (ref 8.5–10.5)
CHLORIDE SERPL-SCNC: 111 MEQ/L (ref 98–111)
CO2 SERPL-SCNC: 20 MEQ/L (ref 23–33)
CREAT SERPL-MCNC: 1.8 MG/DL (ref 0.4–1.2)
DEPRECATED RDW RBC AUTO: 41.8 FL (ref 35–45)
ERYTHROCYTE [DISTWIDTH] IN BLOOD BY AUTOMATED COUNT: 12.1 % (ref 11.5–14.5)
GFR SERPL CREATININE-BSD FRML MDRD: 27 ML/MIN/1.73M2
GLUCOSE SERPL-MCNC: 83 MG/DL (ref 70–108)
HCT VFR BLD AUTO: 33.1 % (ref 37–47)
HGB BLD-MCNC: 11 GM/DL (ref 12–16)
MCH RBC QN AUTO: 31.3 PG (ref 26–33)
MCHC RBC AUTO-ENTMCNC: 33.2 GM/DL (ref 32.2–35.5)
MCV RBC AUTO: 94 FL (ref 81–99)
PLATELET # BLD AUTO: 133 THOU/MM3 (ref 130–400)
PMV BLD AUTO: 10.2 FL (ref 9.4–12.4)
POTASSIUM SERPL-SCNC: 4.6 MEQ/L (ref 3.5–5.2)
RBC # BLD AUTO: 3.52 MILL/MM3 (ref 4.2–5.4)
SODIUM SERPL-SCNC: 140 MEQ/L (ref 135–145)
WBC # BLD AUTO: 5 THOU/MM3 (ref 4.8–10.8)

## 2024-01-28 PROCEDURE — 6370000000 HC RX 637 (ALT 250 FOR IP): Performed by: NURSE PRACTITIONER

## 2024-01-28 PROCEDURE — 2580000003 HC RX 258

## 2024-01-28 PROCEDURE — 99239 HOSP IP/OBS DSCHRG MGMT >30: CPT | Performed by: NURSE PRACTITIONER

## 2024-01-28 PROCEDURE — 6370000000 HC RX 637 (ALT 250 FOR IP): Performed by: PHYSICIAN ASSISTANT

## 2024-01-28 PROCEDURE — 6370000000 HC RX 637 (ALT 250 FOR IP): Performed by: STUDENT IN AN ORGANIZED HEALTH CARE EDUCATION/TRAINING PROGRAM

## 2024-01-28 PROCEDURE — 36415 COLL VENOUS BLD VENIPUNCTURE: CPT

## 2024-01-28 PROCEDURE — 6370000000 HC RX 637 (ALT 250 FOR IP)

## 2024-01-28 PROCEDURE — 85027 COMPLETE CBC AUTOMATED: CPT

## 2024-01-28 PROCEDURE — 80048 BASIC METABOLIC PNL TOTAL CA: CPT

## 2024-01-28 RX ORDER — ACETAMINOPHEN 325 MG/1
650 TABLET ORAL EVERY 4 HOURS PRN
Status: DISCONTINUED | OUTPATIENT
Start: 2024-01-28 | End: 2024-01-28 | Stop reason: HOSPADM

## 2024-01-28 RX ORDER — LOSARTAN POTASSIUM 25 MG/1
25 TABLET ORAL DAILY
Status: DISCONTINUED | OUTPATIENT
Start: 2024-01-28 | End: 2024-01-28 | Stop reason: HOSPADM

## 2024-01-28 RX ORDER — LOSARTAN POTASSIUM 50 MG/1
25 TABLET ORAL DAILY
Qty: 180 TABLET | Refills: 1 | Status: SHIPPED
Start: 2024-01-28

## 2024-01-28 RX ADMIN — ROPINIROLE HYDROCHLORIDE 0.25 MG: 0.25 TABLET, FILM COATED ORAL at 07:33

## 2024-01-28 RX ADMIN — APIXABAN 2.5 MG: 2.5 TABLET, FILM COATED ORAL at 07:33

## 2024-01-28 RX ADMIN — LOSARTAN POTASSIUM 25 MG: 25 TABLET, FILM COATED ORAL at 13:19

## 2024-01-28 RX ADMIN — ACETAMINOPHEN 650 MG: 325 TABLET ORAL at 01:19

## 2024-01-28 RX ADMIN — SODIUM CHLORIDE, PRESERVATIVE FREE 10 ML: 5 INJECTION INTRAVENOUS at 07:33

## 2024-01-28 RX ADMIN — LABETALOL HYDROCHLORIDE 200 MG: 200 TABLET, FILM COATED ORAL at 07:33

## 2024-01-28 ASSESSMENT — PAIN DESCRIPTION - DESCRIPTORS: DESCRIPTORS: ACHING

## 2024-01-28 ASSESSMENT — PAIN DESCRIPTION - PAIN TYPE: TYPE: ACUTE PAIN

## 2024-01-28 ASSESSMENT — PAIN - FUNCTIONAL ASSESSMENT: PAIN_FUNCTIONAL_ASSESSMENT: ACTIVITIES ARE NOT PREVENTED

## 2024-01-28 ASSESSMENT — PAIN DESCRIPTION - LOCATION: LOCATION: HEAD

## 2024-01-28 ASSESSMENT — PAIN SCALES - GENERAL
PAINLEVEL_OUTOF10: 0
PAINLEVEL_OUTOF10: 6
PAINLEVEL_OUTOF10: 4
PAINLEVEL_OUTOF10: 0

## 2024-01-28 ASSESSMENT — PAIN DESCRIPTION - ORIENTATION: ORIENTATION: MID

## 2024-01-28 ASSESSMENT — PAIN DESCRIPTION - ONSET: ONSET: ON-GOING

## 2024-01-28 ASSESSMENT — PAIN DESCRIPTION - FREQUENCY: FREQUENCY: CONTINUOUS

## 2024-01-28 NOTE — DISCHARGE SUMMARY
Hospital Medicine Discharge Summary      Patient Identification:   Joanie Duke   : 1940  MRN: [id number]   Account: [id number]      Patient's PCP: William Lindquist MD    Admit Date: 2024     Discharge Date:   2024    Admitting Physician: No admitting provider for patient encounter.     Discharging Nurse Practitioner: Naheed Murillo, APRN - CNP     Discharge Diagnoses with Assessment/Plan:  Syncopal episode, possibly secondary to orthostatic hypotension/dehydration/medication induced--initial troponin at 20 and repeat trended down to 16, CT head did not reveal anything acute, echocardiogram from 2024 revealed an EF 55 to 60%; initial orthostatics revealed a lying blood pressure 114 and a standing at 93, resolved with IV fluids; question of component of dehydration secondary to ERIN and a creatinine of 2.4 and improved to 1.7; currently only on labetalol and will send Cozaar only 25 mg daily, patient has a blood pressure monitor and I asked her to record and show her findings to her physicians at follow-up visits; she ambulated in the ramirez very well with the nurse; cardiology note reviewed and appreciate input  ERIN on CKD stage IIIb--baseline creatinine appears 1.4; improved to 1.7; resume Cozaar at 25 mg daily; Aldactone on hold, discussed with patient that she would benefit from drinking water and she has been doing much better here  Nonobstructive CAD  PAF with probable secondary hypercoagulable state--TSH 3.8 and free T4 at 1.35; on Normodyne, on Eliquis 2.5 mg twice daily secondary to creatinine level  Primary hypertension, uncontrolled--on Normodyne 200 mg twice daily, at home also is on Imdur 60 mg twice daily, Catapres 0.1 mg twice daily \"if blood pressure is too high\", Cozaar 50 mg twice daily and Aldactone 25 mg daily; on Normodyne here her blood pressure has been stable past couple days however this morning her blood pressure prior to medications was 171/60 and

## 2024-01-28 NOTE — PROGRESS NOTES
Discharge teaching and instructions for diagnosis/procedure of ERIN completed with patient using teachback method. AVS reviewed. Printed prescriptions given to patient. Patient voiced understanding regarding prescriptions, follow up appointments, and care of self at home. Discharged ambulatory and in a wheelchair to  home with support per family.   
Patient ambulated in halls with contact guard assist and no device. Patient tolerated the walk well and denied and dizziness, lightheadedness, chest pain or shortness of breath.  
Patient educated on how to use incentive spirometer. Patient verbalized understanding and demonstrated proper use. Emphasized importance and usage of device, with coughing and deep breathing every 2 hours while awake.        
Problem: Discharge Planning  Goal: Discharge to home or other facility with appropriate resources  1/27/2024 0441 by Mary Zepeda RN  Outcome: Progressing  Note: Pt will discharge home when appropriate.        Problem: ABCDS Injury Assessment  Goal: Absence of physical injury  1/27/2024 0441 by Mary Zepeda RN  Outcome: Progressing  Note: Absence of physical injury.        Problem: Safety - Adult  Goal: Free from fall injury  1/27/2024 0441 by Mary Zepeda RN  Outcome: Progressing  Note: Fall precaution in place. Bed alarm/chair alarm. Bed locked in lowest position. Fall band applied if applicable. Call light and overhead table within reach. Will continue to monitor.        Problem: Cardiovascular - Adult  Goal: Maintains optimal cardiac output and hemodynamic stability  1/27/2024 0441 by Mary Zepeda RN  Outcome: Progressing  Note: Pt vitals are stable on my shift. Will continue to assess.        Problem: Cardiovascular - Adult  Goal: Absence of cardiac dysrhythmias or at baseline  1/27/2024 0441 by Mary Zepeda RN  Outcome: Progressing  Note: Absence of cardiac dysrhythmias on my shift , will continue to assess.        Problem: Metabolic/Fluid and Electrolytes - Adult  Goal: Electrolytes maintained within normal limits  1/27/2024 0441 by Mary Zepeda RN  Outcome: Progressing  Note: Pt electrolytes within normal limits on my shift. Will continue to monitor.        Problem: Hematologic - Adult  Goal: Maintains hematologic stability  1/27/2024 0441 by Mary Zepeda RN  Outcome: Progressing  Note: Pt shows no signs or symptoms of bleeding. Will continue to assess.        Problem: Skin/Tissue Integrity - Adult  Goal: Skin integrity remains intact  1/27/2024 0441 by Mary Zepeda RN  Outcome: Progressing  Note: Absence of new skin integrity issues on my shift. Will continue to assess.      Pt verbalizes understanding of care plan. Pt contributes to goal settings.                    
Pt returned to bed from restroom. She is now resting comfortably with bed alarm on. She denies other needs.   
CONTRAST CLINICAL INFORMATION:syncope COMPARISON: CT head 12/6/2018 TECHNIQUE: 5 mm axial imaging through the head without IV contrast. All CT scans at this facility use dose modulation, iterative reconstruction, and/or weight based dosing when appropriate to reduce the radiation dose to as low as reasonably achievable. FINDINGS: Mild cerebral volume loss. Mild periventricular and subcortical white matter hypodensities that likely relate to chronic microangiopathic disease. No ventriculomegaly.  No midline shift or mass effect.  No acute intracranial hemorrhage. No intracranial collection.  Gray-white differentiation is unremarkable. The posterior fossa is unremarkable. The craniocervical junction is unremarkable. No acute bony abnormality. Hyperostosis from talus is seen Postoperative appearance of paranasal sinuses. The  mastoid air cells are clear. The orbits are unremarkable. Marked atrophy of the pituitary gland.     1. No acute intracranial abnormality. **This report has been created using voice recognition software.  It may contain minor errors which are inherent in voice recognition technology.** Final report electronically signed by Dr Catarina Lima on 1/25/2024 12:03 PM      DVT prophylaxis: [] Lovenox                                 [] SCDs                                 [] SQ Heparin                                 [x] Encourage ambulation           [x] Already on Anticoagulation~Eliquis 2.5 mg twice daily     Code Status: Full Code    PT/OT Eval Status: Monitor    Tele:   [x] Yes paced heart rate 60             [] no    Active Hospital Problems    Diagnosis Date Noted    Syncope and collapse [R55] 01/26/2024    ERIN (acute kidney injury) (HCC) [N17.9] 01/25/2024       Electronically signed by CELINA Estrada CNP on 1/27/2024 at 7:57 AM    
sinuses. The  mastoid air cells are clear. The orbits are unremarkable. Marked atrophy of the pituitary gland.     1. No acute intracranial abnormality. **This report has been created using voice recognition software.  It may contain minor errors which are inherent in voice recognition technology.** Final report electronically signed by Dr Catarina Lima on 1/25/2024 12:03 PM      Electronically signed by Prince Dotson MD on 1/26/2024 at 4:49 PM  
hypotension-dehydration and multiple anithypertensives  Pacer interrogation without significant findings  ECHO without significant structural findings; EF nL  Orthostatics improved; reports poor PO fluid intake PTA  CAD s/p LHC in 2013    PAF/PAFL- on Eliquis  S/p DCPM  Chronic HFpEF; EF 55-60 per TTE 1/26/2024  HTN  Home regimen: labetolol 200 BID; losartan 50 BID; aldactone 25mg QD; Clonidine 0.1 PRN; Imdur 60mg QD  Only receiving labetolol during admission- BP improved; orthostatics improved   ERIN on CKD- improved- nephrotoxic agents on hold- encourage PO intake   HLD      Plan:  Continue to hold off on resuming home antihypertensives. Orthostatics improved-  continue labetolol. If SBP remains <150, would not resume other antihypertensives. Would not resume aldactone at anytime with borderline high potassium.   Discussed adequate hydration/PO intake.  Cont Eliquis 2.5; may change to 5mg BID if sCr <1.5  Follow up with cardiology office in 1 month.   Cardiology will follow PRN at this time. Please reach out with questions or concerns.          Electronically signed by CELINA Hall CNP on 1/27/2024 at 12:14 PM

## 2024-01-29 ENCOUNTER — TELEPHONE (OUTPATIENT)
Dept: CARDIOLOGY CLINIC | Age: 84
End: 2024-01-29

## 2024-01-29 LAB
EKG ATRIAL RATE: 60 BPM
EKG P-R INTERVAL: 228 MS
EKG Q-T INTERVAL: 464 MS
EKG QRS DURATION: 142 MS
EKG QTC CALCULATION (BAZETT): 464 MS
EKG R AXIS: -75 DEGREES
EKG T AXIS: 95 DEGREES
EKG VENTRICULAR RATE: 60 BPM

## 2024-01-29 NOTE — TELEPHONE ENCOUNTER
Pt is calling and was a Dr. Vargas pt and needs a HFU and has had medication changes and would like to see Dr. Ramirez in the [facility] office.  She has an appt scheduled in March with Chaya Trevizo but wants to see Ashley.  Please contact pt at [phone] which is daughter Fela and pt is staying with her currently

## 2024-01-31 NOTE — ED PROVIDER NOTES
[facility] EMERGENCY DEPARTMENT    EMERGENCY MEDICINE     Patient Name: Joanie Duke  MRN: [id number]  YOB: 1940  Date of Evaluation: 1/25/2024  Treating Resident Physician: Isreal Murrieta DO  Supervising Physician: Dr. Julian Rivera MD    CHIEF COMPLAINT       Chief Complaint   Patient presents with    Loss of Consciousness       HISTORY OF PRESENT ILLNESS    HPI    History obtained from child, chart review, and the patient.    Joanie Duke is a 83 y.o. female who presents to the emergency department from assisted living facility brought in by EMS for evaluation of loss of consciousness.  Patient was at her nursing facility eating her yogurt about to play bingo when a co-resident noticed her go limp with her eyes rolled back.  She never fell down or hit her head.  Individual tried to the Heimlich maneuver while she was sitting in the chair and noted yellow foam at her mouth.  Onlookers stated that she was choking.  The color of her yogurt was pink and not yellow.  She regained consciousness at about 3 to 4 minutes later.  When EMS was called and taken to the ED.  Patient has visible vomit noted on her shirt.  Patient states she does not remember the event but does recollect being nauseous prior to her losing consciousness.  She otherwise denies any headaches shortness of breath blurry vision chest pain abdominal pain urinary symptoms including dysuria or increased urinary frequency muscle pain or joint pains.    Pertinent previous and/or external records reviewed: Non-contributory    REVIEW OF SYSTEMS   Review of Systems  Negative unless documented in HPI    PAST MEDICAL AND SURGICAL HISTORY     Past Medical History:   Diagnosis Date    Arthritis     CAD (coronary artery disease)     Hyperlipidemia     Hypertension     Ovarian cyst     right       Past Surgical History:   Procedure Laterality Date    CARDIAC CATHETERIZATION  01/01/2007    CHOLECYSTECTOMY  01/01/1986    COLONOSCOPY      last

## 2024-02-12 NOTE — PROGRESS NOTES
[facility] PROFESSIONAL SERVICES  [facility] [place] [address]   [address]   [facility] [address]   Dept: [phone]   Dept Fax: [phone]   Loc: [phone]      Chief Complaint   Patient presents with    Follow-Up from Hospital     2 week.    Leg Swelling     Ankle swelling      Cardiologist:  Dr. Landaverde (previously skip)   82 yo female presents for hfu for syncope, 2/2 hypotension. Hx of CAD, pacer 2004, HFpEF, PAF on eliquis, mild MR, OH, CKD3, anemia, HLD.     Patient feeling much better. No cp or sob. No dizzy or lightheaded symptoms. BP have been stable. Drinking well altely. Making sure to eat enough throughout the day. Wants to get back to driving again. She is very independent. BP have been 130s-160 mostly. Diastolic is lower.     General:   No fever, no chills, no weight loss, no fatigue  Pulmonary:    No dyspnea, no wheezing  Cardiac:    Denies recent chest pain   GI:     No nausea or vomiting, no abdominal pain  Neuro:    No dizziness or light headedness  Musculoskeletal:  No recent active issues  Extremities:   No edema      Past Medical History:   Diagnosis Date    Arthritis     CAD (coronary artery disease)     Hyperlipidemia     Hypertension     Ovarian cyst     right       Allergies   Allergen Reactions    Latex Itching and Rash    Shellfish-Derived Products Nausea And Vomiting    Amiodarone      Severe headache, throat and tongue swell, and blisters on tongue    Anaprox [Naproxen Sodium]      Not sure of reaction    Avelox [Moxifloxacin Hcl In Nacl]      Confusion and delerium    Biaxin [Clarithromycin]     Bumex [Bumetanide] Other (See Comments)     AFFECTS THE KIDNEYS    Ciprofloxacin Hcl     Codeine      Not sure of reaction    Doxycycline      Dizzy cold sweat syncope    Dyazide [Hydrochlorothiazide W-Triamterene]      Near syncope    Fexofenadine      Not sure of reaction    Fluoxetine      shakes    Hydralazine Nausea Only and Other (See Comments)     RING IN MY

## 2024-02-14 ENCOUNTER — OFFICE VISIT (OUTPATIENT)
Dept: CARDIOLOGY CLINIC | Age: 84
End: 2024-02-14
Payer: MEDICARE

## 2024-02-14 VITALS
HEART RATE: 60 BPM | SYSTOLIC BLOOD PRESSURE: 154 MMHG | DIASTOLIC BLOOD PRESSURE: 67 MMHG | WEIGHT: 160.4 LBS | HEIGHT: 63 IN | BODY MASS INDEX: 28.42 KG/M2

## 2024-02-14 DIAGNOSIS — I10 PRIMARY HYPERTENSION: ICD-10-CM

## 2024-02-14 DIAGNOSIS — R55 SYNCOPE AND COLLAPSE: Primary | ICD-10-CM

## 2024-02-14 DIAGNOSIS — Z95.0 ARTIFICIAL CARDIAC PACEMAKER: ICD-10-CM

## 2024-02-14 PROCEDURE — 3077F SYST BP >= 140 MM HG: CPT | Performed by: STUDENT IN AN ORGANIZED HEALTH CARE EDUCATION/TRAINING PROGRAM

## 2024-02-14 PROCEDURE — 99214 OFFICE O/P EST MOD 30 MIN: CPT | Performed by: STUDENT IN AN ORGANIZED HEALTH CARE EDUCATION/TRAINING PROGRAM

## 2024-02-14 PROCEDURE — 1123F ACP DISCUSS/DSCN MKR DOCD: CPT | Performed by: STUDENT IN AN ORGANIZED HEALTH CARE EDUCATION/TRAINING PROGRAM

## 2024-02-14 PROCEDURE — 3078F DIAST BP <80 MM HG: CPT | Performed by: STUDENT IN AN ORGANIZED HEALTH CARE EDUCATION/TRAINING PROGRAM

## 2024-03-07 ENCOUNTER — NURSE ONLY (OUTPATIENT)
Dept: LAB | Age: 84
End: 2024-03-07

## 2024-03-07 DIAGNOSIS — I48.92 PAROXYSMAL ATRIAL FLUTTER (HCC): ICD-10-CM

## 2024-03-07 DIAGNOSIS — E78.00 PURE HYPERCHOLESTEROLEMIA: ICD-10-CM

## 2024-03-07 DIAGNOSIS — I10 PRIMARY HYPERTENSION: ICD-10-CM

## 2024-03-07 LAB
ALBUMIN SERPL BCG-MCNC: 4 G/DL (ref 3.5–5.1)
ALP SERPL-CCNC: 50 U/L (ref 38–126)
ALT SERPL W/O P-5'-P-CCNC: 13 U/L (ref 11–66)
BILIRUB SERPL-MCNC: 0.5 MG/DL (ref 0.3–1.2)
BUN SERPL-MCNC: 23 MG/DL (ref 7–22)
CALCIUM SERPL-MCNC: 9.6 MG/DL (ref 8.5–10.5)
CHLORIDE SERPL-SCNC: 103 MEQ/L (ref 98–111)
CHOLEST SERPL-MCNC: 181 MG/DL (ref 100–199)
CO2 SERPL-SCNC: 25 MEQ/L (ref 23–33)
CREAT SERPL-MCNC: 1.7 MG/DL (ref 0.4–1.2)
DEPRECATED RDW RBC AUTO: 43.3 FL (ref 35–45)
ERYTHROCYTE [DISTWIDTH] IN BLOOD BY AUTOMATED COUNT: 12.3 % (ref 11.5–14.5)
GFR SERPL CREATININE-BSD FRML MDRD: 29 ML/MIN/1.73M2
GLUCOSE SERPL-MCNC: 100 MG/DL (ref 70–108)
HCT VFR BLD AUTO: 38.5 % (ref 37–47)
HDLC SERPL-MCNC: 45 MG/DL
HGB BLD-MCNC: 12.3 GM/DL (ref 12–16)
MCH RBC QN AUTO: 30.8 PG (ref 26–33)
MCHC RBC AUTO-ENTMCNC: 31.9 GM/DL (ref 32.2–35.5)
MCV RBC AUTO: 96.3 FL (ref 81–99)
PMV BLD AUTO: 10.3 FL (ref 9.4–12.4)
PROT SERPL-MCNC: 6.9 G/DL (ref 6.1–8)
SODIUM SERPL-SCNC: 140 MEQ/L (ref 135–145)
TRIGL SERPL-MCNC: 121 MG/DL (ref 0–199)
WBC # BLD AUTO: 5.2 THOU/MM3 (ref 4.8–10.8)

## 2024-03-11 ENCOUNTER — NURSE ONLY (OUTPATIENT)
Dept: CARDIOLOGY CLINIC | Age: 84
End: 2024-03-11
Payer: MEDICARE

## 2024-03-11 DIAGNOSIS — Z95.0 PACEMAKER: Primary | ICD-10-CM

## 2024-03-11 PROCEDURE — 93280 PM DEVICE PROGR EVAL DUAL: CPT | Performed by: NUCLEAR MEDICINE

## 2024-03-11 NOTE — PROGRESS NOTES
FORMER DR PISANO PT   WILL BE SEEING DR LUIZ TREJO DUAL PACER CHECK IN OFFICE / NO MERLIN AND DOES NOT WANT    PRESENTS IN APVP  UNDERLYING DEPENDENT IN VENTRICLES     DDDR     A PACED 70%  V PACED >99%    ATRIAL IMPEDENCE 560  VENT IMPEDENCE 560    P WAVES 0.9  RV WAVES DEPENDENT   ATRIAL THRESHOLD 1.625 @ 0.5  VENT THRESHOLD 0.75 @ 0.5    ATRIAL AND VENT AMPLITUDES AUTO       KNOWN AFIB/ ELIQUIS   AFIB BURDEN <1%    NO RECENT EPISODES

## 2024-03-27 DIAGNOSIS — R25.9 MIXED ACTION AND RESTING TREMOR: ICD-10-CM

## 2024-03-27 RX ORDER — ROPINIROLE 0.25 MG/1
0.25 TABLET, FILM COATED ORAL 2 TIMES DAILY
Qty: 180 TABLET | Refills: 1 | Status: SHIPPED | OUTPATIENT
Start: 2024-03-27

## 2024-03-27 NOTE — TELEPHONE ENCOUNTER
Joanie Duke called requesting a refill on the following medications:  Requested Prescriptions     Pending Prescriptions Disp Refills    rOPINIRole (REQUIP) 0.25 MG tablet 180 tablet 1     Sig: Take 1 tablet by mouth 2 times daily (9am and 5pm)       Date of last visit: 10/9/2023- Paige Leopold, CNP  Date of next visit (if applicable):4/15/2024- Paige Leopold, CNP  Date of last refill: 10/9/23  Pharm[facility] Name: [facility],  Joann Cummins LPN

## 2024-04-15 ENCOUNTER — OFFICE VISIT (OUTPATIENT)
Dept: NEUROLOGY | Age: 84
End: 2024-04-15
Payer: MEDICARE

## 2024-04-15 VITALS
BODY MASS INDEX: 27.82 KG/M2 | WEIGHT: 157 LBS | HEIGHT: 63 IN | SYSTOLIC BLOOD PRESSURE: 139 MMHG | HEART RATE: 60 BPM | OXYGEN SATURATION: 97 % | DIASTOLIC BLOOD PRESSURE: 60 MMHG

## 2024-04-15 DIAGNOSIS — R25.9 MIXED ACTION AND RESTING TREMOR: Primary | ICD-10-CM

## 2024-04-15 PROCEDURE — 3078F DIAST BP <80 MM HG: CPT | Performed by: NURSE PRACTITIONER

## 2024-04-15 PROCEDURE — 3075F SYST BP GE 130 - 139MM HG: CPT | Performed by: NURSE PRACTITIONER

## 2024-04-15 PROCEDURE — 1123F ACP DISCUSS/DSCN MKR DOCD: CPT | Performed by: NURSE PRACTITIONER

## 2024-04-15 PROCEDURE — 99213 OFFICE O/P EST LOW 20 MIN: CPT | Performed by: NURSE PRACTITIONER

## 2024-04-15 NOTE — PATIENT INSTRUCTIONS
Continue with  Requip 0.25 mg two times a day. Refills given.   Call with any new symptoms or concerns.   Follow up in 8 months

## 2024-04-15 NOTE — PROGRESS NOTES
NEUROLOGY OUT PATIENT FOLLOW UP NOTE:  4/15/78851:09 PM    Joanie Duke is here for follow up for mixed action and resting tremor.            Allergies   Allergen Reactions    Latex Itching and Rash    Shellfish-Derived Products Nausea And Vomiting    Amiodarone      Severe headache, throat and tongue swell, and blisters on tongue    Anaprox [Naproxen Sodium]      Not sure of reaction    Avelox [Moxifloxacin Hcl In Nacl]      Confusion and delerium    Biaxin [Clarithromycin]     Bumex [Bumetanide] Other (See Comments)     AFFECTS THE KIDNEYS    Ciprofloxacin Hcl     Codeine      Not sure of reaction    Doxycycline      Dizzy cold sweat syncope    Dyazide [Hydrochlorothiazide W-Triamterene]      Near syncope    Fexofenadine      Not sure of reaction    Fluoxetine      shakes    Hydralazine Nausea Only and Other (See Comments)     RING IN MY EARS    Hyoscyamine      Dizziness and affected eyes    Indocin [Indometacin Sodium]      Not sure of reaction    Mometasone      Nervousness, severe headache, body aches    Morphine Nausea Only     tiredness    Norvasc [Amlodipine Besylate] Swelling     Feet, legs, hand    Omnicef      Vaginal yeast infection    Plavix [Clopidogrel Bisulfate]      bruises    Prednisone Swelling    Promethazine Nausea Only     Stomach cramp    Relafen [Nabumetone] Hives     dizziness    Statins Depletion Therapy      Muscle ache cramp    Triamterene     Amoxicillin Itching and Rash    Cipro Xr Nausea And Vomiting     Stomach pain    Darvocet [Propoxyphene N-Acetaminophen] Itching and Rash    Diflucan [Fluconazole] Nausea And Vomiting     Stomach cramp    Dye [Iodides] Nausea And Vomiting    Hydrocodone Nausea And Vomiting     redness    Niacin And Related Itching and Rash     blisters    Pcn [Penicillins] Rash    Sulfa Antibiotics Itching and Rash    Viroxyn [Benzalkonium Chloride] Nausea Only, Swelling and Rash    Zonalon [Doxepin Hcl] Rash       Current Outpatient Medications:     rOPINIRole

## 2024-04-16 ENCOUNTER — OFFICE VISIT (OUTPATIENT)
Dept: CARDIOLOGY CLINIC | Age: 84
End: 2024-04-16
Payer: MEDICARE

## 2024-04-16 VITALS
WEIGHT: 157.4 LBS | HEART RATE: 65 BPM | DIASTOLIC BLOOD PRESSURE: 74 MMHG | HEIGHT: 63 IN | BODY MASS INDEX: 27.89 KG/M2 | SYSTOLIC BLOOD PRESSURE: 173 MMHG

## 2024-04-16 DIAGNOSIS — Z95.0 PACEMAKER: Primary | ICD-10-CM

## 2024-04-16 DIAGNOSIS — I10 PRIMARY HYPERTENSION: ICD-10-CM

## 2024-04-16 DIAGNOSIS — I48.0 PAROXYSMAL ATRIAL FIBRILLATION (HCC): ICD-10-CM

## 2024-04-16 DIAGNOSIS — E78.01 FAMILIAL HYPERCHOLESTEROLEMIA: ICD-10-CM

## 2024-04-16 PROCEDURE — 99204 OFFICE O/P NEW MOD 45 MIN: CPT | Performed by: NUCLEAR MEDICINE

## 2024-04-16 PROCEDURE — 3078F DIAST BP <80 MM HG: CPT | Performed by: NUCLEAR MEDICINE

## 2024-04-16 PROCEDURE — 1123F ACP DISCUSS/DSCN MKR DOCD: CPT | Performed by: NUCLEAR MEDICINE

## 2024-04-16 PROCEDURE — 3077F SYST BP >= 140 MM HG: CPT | Performed by: NUCLEAR MEDICINE

## 2024-04-16 ASSESSMENT — ENCOUNTER SYMPTOMS
ANAL BLEEDING: 0
ABDOMINAL PAIN: 0
COLOR CHANGE: 0
SHORTNESS OF BREATH: 0
VOMITING: 0
ABDOMINAL DISTENTION: 0
RECTAL PAIN: 0
BACK PAIN: 0
BLOOD IN STOOL: 0
PHOTOPHOBIA: 0
DIARRHEA: 0
CHEST TIGHTNESS: 0
CONSTIPATION: 0
NAUSEA: 0

## 2024-04-16 NOTE — PROGRESS NOTES
as a child    TURBINOPLASTIES  2011    Dr. Salinas     Family History   Problem Relation Age of Onset    Early Death Mother 37         of pneumonia and pleurisy    Arthritis Father     Diabetes Father     Heart Disease Sister     Diabetes Brother     Diabetes Maternal Aunt     Arthritis Paternal Grandmother     Heart Disease Paternal Grandmother         murmur    Diabetes Paternal Grandfather     Diabetes Other     Hypertension Other     Heart Failure Other     Allergies Other      Social History     Tobacco Use    Smoking status: Never    Smokeless tobacco: Never   Substance Use Topics    Alcohol use: No      Current Outpatient Medications   Medication Sig Dispense Refill    rOPINIRole (REQUIP) 0.25 MG tablet Take 1 tablet by mouth 2 times daily (9am and 5pm) 180 tablet 1    apixaban (ELIQUIS) 5 MG TABS tablet Take 0.5 tablets by mouth 2 times daily 60 tablet 6    ezetimibe (ZETIA) 10 MG tablet TAKE 1 TABLET EVERY NIGHT 90 tablet 0    labetalol (NORMODYNE) 200 MG tablet Take 1 tablet by mouth 2 times daily 180 tablet 1    FIBER PO Take by mouth      Cholecalciferol (VITAMIN D3) 2000 UNITS CAPS Take 1 capsule by mouth daily      Multiple Vitamins-Minerals (OCUVITE PO) Take by mouth      Multiple Vitamins-Minerals (HAIR/SKIN/NAILS PO) Take by mouth      acetaminophen (TYLENOL) 325 MG tablet Take  by mouth as needed for Pain.      nitroGLYCERIN (NITROSTAT) 0.4 MG SL tablet Place 1 tablet under the tongue every 5 minutes as needed for Chest pain      Ascorbic Acid (VITAMIN C) 500 MG tablet Take 1 tablet by mouth daily      Cyanocobalamin (B-12 PO) Take 500 mcg by mouth daily.      losartan (COZAAR) 50 MG tablet Take 0.5 tablets by mouth daily 180 tablet 1     No current facility-administered medications for this visit.     Allergies   Allergen Reactions    Latex Itching and Rash    Shellfish-Derived Products Nausea And Vomiting    Amiodarone      Severe headache, throat and tongue swell, and blisters on tongue

## 2024-06-18 RX ORDER — LABETALOL 200 MG/1
200 TABLET, FILM COATED ORAL 2 TIMES DAILY
Qty: 180 TABLET | Refills: 2 | Status: SHIPPED | OUTPATIENT
Start: 2024-06-18

## 2024-06-18 NOTE — TELEPHONE ENCOUNTER
Joanie Duke called requesting a refill on the following medications:  Requested Prescriptions     Pending Prescriptions Disp Refills    labetalol (NORMODYNE) 200 MG tablet 180 tablet 1     Sig: Take 1 tablet by mouth 2 times daily     Pharmacy verified:[facility], ph. [phone]  .pv      Date of last visit: 04.16.2024  Date of next visit (if applicable): 04.29.2024

## 2024-07-16 DIAGNOSIS — I48.92 PAROXYSMAL ATRIAL FLUTTER (HCC): ICD-10-CM

## 2024-07-16 NOTE — TELEPHONE ENCOUNTER
Joanie Duke called requesting a refill on the following medications:  Requested Prescriptions     Pending Prescriptions Disp Refills    apixaban (ELIQUIS) 5 MG TABS tablet 60 tablet 6     Sig: Take 0.5 tablets by mouth 2 times daily     Pharmacy verified:    [facility], [facility]. [address] [phone] - F [phone]     Date of last visit: 04/16/2024  Date of next visit (if applicable): 04/29/2025      Pt requests 90 day supply

## 2024-09-11 RX ORDER — LOSARTAN POTASSIUM 50 MG/1
25 TABLET ORAL DAILY
Qty: 180 TABLET | Refills: 2 | Status: SHIPPED | OUTPATIENT
Start: 2024-09-11

## 2024-09-11 RX ORDER — EZETIMIBE 10 MG/1
10 TABLET ORAL NIGHTLY
Qty: 90 TABLET | Refills: 2 | Status: SHIPPED | OUTPATIENT
Start: 2024-09-11

## 2024-09-12 DIAGNOSIS — I48.92 PAROXYSMAL ATRIAL FLUTTER (HCC): ICD-10-CM

## 2024-09-17 ENCOUNTER — NURSE ONLY (OUTPATIENT)
Dept: CARDIOLOGY CLINIC | Age: 84
End: 2024-09-17

## 2024-09-17 DIAGNOSIS — Z95.0 PACEMAKER: Primary | ICD-10-CM

## 2024-09-25 ENCOUNTER — TELEPHONE (OUTPATIENT)
Dept: CARDIOLOGY CLINIC | Age: 84
End: 2024-09-25

## 2024-09-26 ENCOUNTER — OFFICE VISIT (OUTPATIENT)
Dept: CARDIOLOGY CLINIC | Age: 84
End: 2024-09-26
Payer: MEDICARE

## 2024-09-26 VITALS
WEIGHT: 153 LBS | BODY MASS INDEX: 27.11 KG/M2 | SYSTOLIC BLOOD PRESSURE: 178 MMHG | HEART RATE: 69 BPM | DIASTOLIC BLOOD PRESSURE: 77 MMHG | HEIGHT: 63 IN

## 2024-09-26 DIAGNOSIS — I10 PRIMARY HYPERTENSION: Primary | ICD-10-CM

## 2024-09-26 DIAGNOSIS — I25.10 CORONARY ARTERY DISEASE INVOLVING NATIVE CORONARY ARTERY OF NATIVE HEART WITHOUT ANGINA PECTORIS: ICD-10-CM

## 2024-09-26 DIAGNOSIS — I48.0 PAF (PAROXYSMAL ATRIAL FIBRILLATION) (HCC): ICD-10-CM

## 2024-09-26 DIAGNOSIS — Z95.0 PACEMAKER: ICD-10-CM

## 2024-09-26 PROCEDURE — 3077F SYST BP >= 140 MM HG: CPT | Performed by: STUDENT IN AN ORGANIZED HEALTH CARE EDUCATION/TRAINING PROGRAM

## 2024-09-26 PROCEDURE — 99214 OFFICE O/P EST MOD 30 MIN: CPT | Performed by: STUDENT IN AN ORGANIZED HEALTH CARE EDUCATION/TRAINING PROGRAM

## 2024-09-26 PROCEDURE — 3078F DIAST BP <80 MM HG: CPT | Performed by: STUDENT IN AN ORGANIZED HEALTH CARE EDUCATION/TRAINING PROGRAM

## 2024-09-26 PROCEDURE — 1123F ACP DISCUSS/DSCN MKR DOCD: CPT | Performed by: STUDENT IN AN ORGANIZED HEALTH CARE EDUCATION/TRAINING PROGRAM

## 2024-09-30 ENCOUNTER — HOSPITAL ENCOUNTER (EMERGENCY)
Age: 84
Discharge: HOME | End: 2024-09-30
Payer: MEDICARE

## 2024-09-30 ENCOUNTER — APPOINTMENT (OUTPATIENT)
Dept: GENERAL RADIOLOGY | Age: 84
End: 2024-09-30
Payer: MEDICARE

## 2024-09-30 VITALS
SYSTOLIC BLOOD PRESSURE: 184 MMHG | HEIGHT: 63 IN | HEART RATE: 61 BPM | OXYGEN SATURATION: 97 % | BODY MASS INDEX: 27.11 KG/M2 | DIASTOLIC BLOOD PRESSURE: 62 MMHG | RESPIRATION RATE: 14 BRPM | WEIGHT: 153 LBS | TEMPERATURE: 97.3 F

## 2024-09-30 DIAGNOSIS — I10 ASYMPTOMATIC HYPERTENSION: ICD-10-CM

## 2024-09-30 DIAGNOSIS — I10 ELEVATED BLOOD PRESSURE READING WITH DIAGNOSIS OF HYPERTENSION: Primary | ICD-10-CM

## 2024-09-30 LAB
ANION GAP SERPL CALC-SCNC: 12 MEQ/L (ref 8–16)
BACTERIA: ABNORMAL
BASOPHILS ABSOLUTE: 0 THOU/MM3 (ref 0–0.1)
BASOPHILS NFR BLD AUTO: 0.4 %
BILIRUB UR QL STRIP: NEGATIVE
BUN SERPL-MCNC: 20 MG/DL (ref 7–22)
CALCIUM SERPL-MCNC: 9.1 MG/DL (ref 8.5–10.5)
CASTS #/AREA URNS LPF: ABNORMAL /LPF
CASTS #/AREA URNS LPF: ABNORMAL /LPF
CHARACTER UR: CLEAR
CHARCOAL URNS QL MICRO: ABNORMAL
CHLORIDE SERPL-SCNC: 104 MEQ/L (ref 98–111)
CO2 SERPL-SCNC: 24 MEQ/L (ref 23–33)
COLOR UR: YELLOW
CREAT SERPL-MCNC: 1.4 MG/DL (ref 0.4–1.2)
CRYSTALS URNS QL MICRO: ABNORMAL
DEPRECATED RDW RBC AUTO: 44.5 FL (ref 35–45)
EKG ATRIAL RATE: 110 BPM
EKG Q-T INTERVAL: 424 MS
EKG QRS DURATION: 150 MS
EKG QTC CALCULATION (BAZETT): 444 MS
EKG R AXIS: -79 DEGREES
EKG T AXIS: 100 DEGREES
EKG VENTRICULAR RATE: 66 BPM
EOSINOPHIL NFR BLD AUTO: 6.2 %
EOSINOPHILS ABSOLUTE: 0.3 THOU/MM3 (ref 0–0.4)
EPITHELIAL CELLS, UA: ABNORMAL /HPF
ERYTHROCYTE [DISTWIDTH] IN BLOOD BY AUTOMATED COUNT: 13 % (ref 11.5–14.5)
GFR SERPL CREATININE-BSD FRML MDRD: 37 ML/MIN/1.73M2
GLUCOSE SERPL-MCNC: 109 MG/DL (ref 70–108)
GLUCOSE UR QL STRIP.AUTO: NEGATIVE MG/DL
HCT VFR BLD AUTO: 38.2 % (ref 37–47)
HGB BLD-MCNC: 12.5 GM/DL (ref 12–16)
HGB UR QL STRIP.AUTO: NEGATIVE
IMM GRANULOCYTES # BLD AUTO: 0.01 THOU/MM3 (ref 0–0.07)
IMM GRANULOCYTES NFR BLD AUTO: 0.2 %
KETONES UR QL STRIP.AUTO: NEGATIVE
LEUKOCYTE ESTERASE UR QL STRIP.AUTO: ABNORMAL
LYMPHOCYTES ABSOLUTE: 1.3 THOU/MM3 (ref 1–4.8)
LYMPHOCYTES NFR BLD AUTO: 28.1 %
MCH RBC QN AUTO: 30.6 PG (ref 26–33)
MCHC RBC AUTO-ENTMCNC: 32.7 GM/DL (ref 32.2–35.5)
MCV RBC AUTO: 93.6 FL (ref 81–99)
MONOCYTES ABSOLUTE: 0.4 THOU/MM3 (ref 0.4–1.3)
MONOCYTES NFR BLD AUTO: 8.5 %
NEUTROPHILS ABSOLUTE: 2.7 THOU/MM3 (ref 1.8–7.7)
NEUTROPHILS NFR BLD AUTO: 56.6 %
NITRITE UR QL STRIP.AUTO: NEGATIVE
NRBC BLD AUTO-RTO: 0 /100 WBC
OSMOLALITY SERPL CALC.SUM OF ELEC: 282.6 MOSMOL/KG (ref 275–300)
PH UR STRIP.AUTO: 6.5 [PH] (ref 5–9)
PLATELET # BLD AUTO: 146 THOU/MM3 (ref 130–400)
PMV BLD AUTO: 10.5 FL (ref 9.4–12.4)
POTASSIUM SERPL-SCNC: 4 MEQ/L (ref 3.5–5.2)
PROT UR STRIP.AUTO-MCNC: NEGATIVE MG/DL
RBC # BLD AUTO: 4.08 MILL/MM3 (ref 4.2–5.4)
RBC #/AREA URNS HPF: ABNORMAL /HPF
RENAL EPI CELLS #/AREA URNS HPF: ABNORMAL /[HPF]
SODIUM SERPL-SCNC: 140 MEQ/L (ref 135–145)
SP GR UR REFRACT.AUTO: 1.01 (ref 1–1.03)
TROPONIN, HIGH SENSITIVITY: 21 NG/L (ref 0–12)
TROPONIN, HIGH SENSITIVITY: 21 NG/L (ref 0–12)
UROBILINOGEN UR QL STRIP.AUTO: 0.2 EU/DL (ref 0–1)
WBC # BLD AUTO: 4.7 THOU/MM3 (ref 4.8–10.8)
WBC #/AREA URNS HPF: ABNORMAL /HPF
YEAST LIKE FUNGI URNS QL MICRO: ABNORMAL

## 2024-09-30 PROCEDURE — 84484 ASSAY OF TROPONIN QUANT: CPT

## 2024-09-30 PROCEDURE — 96376 TX/PRO/DX INJ SAME DRUG ADON: CPT

## 2024-09-30 PROCEDURE — 81001 URINALYSIS AUTO W/SCOPE: CPT

## 2024-09-30 PROCEDURE — 36415 COLL VENOUS BLD VENIPUNCTURE: CPT

## 2024-09-30 PROCEDURE — 71045 X-RAY EXAM CHEST 1 VIEW: CPT

## 2024-09-30 PROCEDURE — 85025 COMPLETE CBC W/AUTO DIFF WBC: CPT

## 2024-09-30 PROCEDURE — 93010 ELECTROCARDIOGRAM REPORT: CPT | Performed by: INTERNAL MEDICINE

## 2024-09-30 PROCEDURE — 99285 EMERGENCY DEPT VISIT HI MDM: CPT

## 2024-09-30 PROCEDURE — 87086 URINE CULTURE/COLONY COUNT: CPT

## 2024-09-30 PROCEDURE — 80048 BASIC METABOLIC PNL TOTAL CA: CPT

## 2024-09-30 PROCEDURE — 93005 ELECTROCARDIOGRAM TRACING: CPT | Performed by: PHYSICIAN ASSISTANT

## 2024-09-30 PROCEDURE — 96374 THER/PROPH/DIAG INJ IV PUSH: CPT

## 2024-09-30 PROCEDURE — 2500000003 HC RX 250 WO HCPCS: Performed by: PHYSICIAN ASSISTANT

## 2024-09-30 RX ORDER — METOPROLOL TARTRATE 1 MG/ML
5 INJECTION, SOLUTION INTRAVENOUS ONCE
Status: DISCONTINUED | OUTPATIENT
Start: 2024-09-30 | End: 2024-09-30

## 2024-09-30 RX ORDER — LABETALOL HYDROCHLORIDE 5 MG/ML
10 INJECTION, SOLUTION INTRAVENOUS ONCE
Status: DISCONTINUED | OUTPATIENT
Start: 2024-09-30 | End: 2024-09-30 | Stop reason: HOSPADM

## 2024-09-30 RX ORDER — METOPROLOL TARTRATE 1 MG/ML
5 INJECTION, SOLUTION INTRAVENOUS ONCE
Status: COMPLETED | OUTPATIENT
Start: 2024-09-30 | End: 2024-09-30

## 2024-09-30 RX ADMIN — METOPROLOL TARTRATE 5 MG: 5 INJECTION, SOLUTION INTRAVENOUS at 05:19

## 2024-09-30 RX ADMIN — METOPROLOL TARTRATE 5 MG: 5 INJECTION INTRAVENOUS at 03:57

## 2024-09-30 ASSESSMENT — PAIN - FUNCTIONAL ASSESSMENT
PAIN_FUNCTIONAL_ASSESSMENT: NONE - DENIES PAIN

## 2024-09-30 NOTE — ED TRIAGE NOTES
Pt to ED via EMS c/o hypertension. Pt states she was laying in bed and checked her BP and it read 192/91. Pt sates she took 3 half tablets of her Losartan 25mg. Pt states she has a HX of hypertension. Pt denies any CP or SOB. Pt A&O. EKG and labs obtained

## 2024-09-30 NOTE — ED NOTES
Pt VS stable. Respirations even and unlabored. Call light within reach. Pt denies further needs at this time.

## 2024-09-30 NOTE — ED PROVIDER NOTES
Foot surgery (Left, 06/24/2015); and Pacer Interrogate (1/26/2024).    CURRENT MEDICATIONS    No current facility-administered medications for this encounter.    Current Outpatient Medications:     apixaban (ELIQUIS) 2.5 MG TABS tablet, Take 1 tablet by mouth 2 times daily, Disp: 180 tablet, Rfl: 3    ezetimibe (ZETIA) 10 MG tablet, Take 1 tablet by mouth nightly, Disp: 90 tablet, Rfl: 2    losartan (COZAAR) 50 MG tablet, Take 0.5 tablets by mouth daily, Disp: 180 tablet, Rfl: 2    labetalol (NORMODYNE) 200 MG tablet, Take 1 tablet by mouth 2 times daily, Disp: 180 tablet, Rfl: 2    rOPINIRole (REQUIP) 0.25 MG tablet, Take 1 tablet by mouth 2 times daily (9am and 5pm), Disp: 180 tablet, Rfl: 1    FIBER PO, Take by mouth, Disp: , Rfl:     Cholecalciferol (VITAMIN D3) 2000 UNITS CAPS, Take 1 capsule by mouth daily, Disp: , Rfl:     Multiple Vitamins-Minerals (OCUVITE PO), Take by mouth, Disp: , Rfl:     Multiple Vitamins-Minerals (HAIR/SKIN/NAILS PO), Take by mouth, Disp: , Rfl:     acetaminophen (TYLENOL) 325 MG tablet, Take  by mouth as needed for Pain., Disp: , Rfl:     nitroGLYCERIN (NITROSTAT) 0.4 MG SL tablet, Place 1 tablet under the tongue every 5 minutes as needed for Chest pain, Disp: , Rfl:     Ascorbic Acid (VITAMIN C) 500 MG tablet, Take 1 tablet by mouth daily, Disp: , Rfl:     Cyanocobalamin (B-12 PO), Take 500 mcg by mouth daily., Disp: , Rfl:     ALLERGIES    is allergic to latex, shellfish-derived products, amiodarone, anaprox [naproxen sodium], avelox [moxifloxacin hcl in nacl], biaxin [clarithromycin], bumex [bumetanide], ciprofloxacin hcl, codeine, doxycycline, dyazide [hydrochlorothiazide w-triamterene], fexofenadine, fluoxetine, hydralazine, hyoscyamine, indocin [indometacin sodium], mometasone, morphine, norvasc [amlodipine besylate], omnicef, plavix [clopidogrel bisulfate], prednisone, promethazine, relafen [nabumetone], statins depletion therapy, triamterene, amoxicillin, cipro xr, darvocet

## 2024-09-30 NOTE — ED PROVIDER NOTES
should follow up with their PCP, a specialist or when they should return to the emergency department. Red flag signs and symptoms that would warrant immediate need to return to the Emergency Department are discussed. Patient verbalizes understanding of return precautions, denies further questions, and is agreeable to being discharged at this time. The patient understood these instructions and I answered all necessary questions.     Please note that this medical record has been dictated using voice recognition software, and transcription errors are possible. Occasional wrong-word or “sound-alike” substitutions may have occurred due to the inherent limitations of voice recognition technology. Please read the chart carefully and recognize, using context, where these substitutions have occurred.       Austin Mcnair PA  09/30/24 0718

## 2024-09-30 NOTE — DISCHARGE INSTRUCTIONS
Call your regular doctor today at 8 AM to be rechecked within the next 48 hours.  Take your blood pressure medication as directed.  Return to the ER if any new symptoms arise.    Discharge warning    Please remember that examination and testing performed in the emergency department is not a comprehensive evaluation of all medical conditions and does not replace the need to follow up with your primary care provider.  In the emergency department, we are only able to evaluate your symptoms in the current condition, but symptoms may change or worsen.  Although you are felt safe to be discharged today, if your symptoms persist or change, you need to be re-evaluated by your regular/primary care doctor as soon as possible.  If you are unable to make appointment with your regular doctor, please come back to the ER to be re-evaluated.

## 2024-10-01 LAB
BACTERIA UR CULT: ABNORMAL
ORGANISM: ABNORMAL

## 2024-10-08 ENCOUNTER — OFFICE VISIT (OUTPATIENT)
Dept: CARDIOLOGY CLINIC | Age: 84
End: 2024-10-08
Payer: MEDICARE

## 2024-10-08 VITALS
DIASTOLIC BLOOD PRESSURE: 72 MMHG | HEART RATE: 59 BPM | BODY MASS INDEX: 28.79 KG/M2 | HEIGHT: 61 IN | SYSTOLIC BLOOD PRESSURE: 134 MMHG | WEIGHT: 152.5 LBS | OXYGEN SATURATION: 98 %

## 2024-10-08 DIAGNOSIS — I10 PRIMARY HYPERTENSION: ICD-10-CM

## 2024-10-08 DIAGNOSIS — Z95.0 PACEMAKER: ICD-10-CM

## 2024-10-08 DIAGNOSIS — I48.0 PAROXYSMAL ATRIAL FIBRILLATION (HCC): Primary | ICD-10-CM

## 2024-10-08 PROCEDURE — 1123F ACP DISCUSS/DSCN MKR DOCD: CPT | Performed by: PHYSICIAN ASSISTANT

## 2024-10-08 PROCEDURE — 3078F DIAST BP <80 MM HG: CPT | Performed by: PHYSICIAN ASSISTANT

## 2024-10-08 PROCEDURE — 99214 OFFICE O/P EST MOD 30 MIN: CPT | Performed by: PHYSICIAN ASSISTANT

## 2024-10-08 PROCEDURE — 3075F SYST BP GE 130 - 139MM HG: CPT | Performed by: PHYSICIAN ASSISTANT

## 2024-10-08 RX ORDER — LOSARTAN POTASSIUM 50 MG/1
50 TABLET ORAL DAILY
Qty: 180 TABLET | Refills: 2 | Status: SHIPPED | OUTPATIENT
Start: 2024-10-08

## 2024-10-08 NOTE — PROGRESS NOTES
[facility] [place] [facility]  [facility] CARDIOLOGY  [address]  [address]  [facility] [address]  Dept: [phone]  Dept Fax: [phone]  Loc: [phone]    Chief Complaint   Patient presents with    Follow-Up from Hospital     Hospital follow up.    Patient was in the hospital on 09/30/2024 for hypertension.    Last EKG done on 09/30/2024.    Denies chest pain, palpitations, dizziness, shortness of breath, and edema.      Patient with a history of pacemaker, coronary artery disease, paroxysmal atrial fibrillation presents with elevated blood pressure.  Patient states since increasing her losartan to 50 mg a day her blood pressure has been better controlled.  She states she has had at least 2 episodes of palpitations with shortness of breath and chest pressure that she feels is likely atrial fibrillation.  Cardiologist:  Dr. Landaverde        General:   No fever, no chills, No fatigue or weight loss  Pulmonary:   Intermittent shortness of breath   cardiac:    Denies recent chest pain   GI:     No nausea or vomiting, no abdominal pain  Neuro:    No dizziness or light headedness  Musculoskeletal:  No recent active issues  Extremities:   No edema, good peripheral pulses      Past Medical History:   Diagnosis Date    Arthritis     CAD (coronary artery disease)     Hyperlipidemia     Hypertension     Ovarian cyst     right       Allergies   Allergen Reactions    Latex Itching and Rash    Shellfish-Derived Products Nausea And Vomiting    Amiodarone      Severe headache, throat and tongue swell, and blisters on tongue    Anaprox [Naproxen Sodium]      Not sure of reaction    Avelox [Moxifloxacin Hcl In Nacl]      Confusion and delerium    Biaxin [Clarithromycin]     Bumex [Bumetanide] Other (See Comments)     AFFECTS THE KIDNEYS    Ciprofloxacin Hcl     Codeine      Not sure of reaction    Doxycycline      Dizzy cold sweat syncope    Dyazide [Hydrochlorothiazide W-Triamterene]      Near

## 2024-10-15 ENCOUNTER — HOSPITAL ENCOUNTER (OUTPATIENT)
Dept: WOMENS IMAGING | Age: 84
Discharge: HOME | End: 2024-10-15
Payer: MEDICARE

## 2024-10-15 DIAGNOSIS — N95.9 PERIMENOPAUSAL DISORDER: ICD-10-CM

## 2024-10-15 DIAGNOSIS — N95.8 POSTARTIFICIAL MENOPAUSAL SYNDROME: ICD-10-CM

## 2024-10-15 PROCEDURE — 77080 DXA BONE DENSITY AXIAL: CPT

## 2024-10-22 ENCOUNTER — HOSPITAL ENCOUNTER (OUTPATIENT)
Age: 84
Discharge: HOME | End: 2024-10-24
Attending: PHYSICIAN ASSISTANT
Payer: MEDICARE

## 2024-10-22 DIAGNOSIS — I48.0 PAROXYSMAL ATRIAL FIBRILLATION (HCC): ICD-10-CM

## 2024-10-22 PROCEDURE — 93270 REMOTE 30 DAY ECG REV/REPORT: CPT

## 2024-11-18 DIAGNOSIS — R25.9 MIXED ACTION AND RESTING TREMOR: ICD-10-CM

## 2024-11-19 RX ORDER — ROPINIROLE 0.25 MG/1
TABLET, FILM COATED ORAL
Qty: 180 TABLET | Refills: 1 | Status: SHIPPED | OUTPATIENT
Start: 2024-11-19

## 2024-11-19 NOTE — TELEPHONE ENCOUNTER
Joanie Duke called requesting a refill on the following medications:  Requested Prescriptions     Pending Prescriptions Disp Refills    rOPINIRole (REQUIP) 0.25 MG tablet [Pharmacy Med Name: Ropinirole Hcl Tab 0.25 Mg (Alembic] 180 tablet 1     Sig: TAKE ONE TABLET BY MOUTH TWICE A DAY Y (9AM AND 5PM)       Date of last visit: 4/15/2024  Date of next visit (if applicable):Visit date not found  Date of last refill: 3/27/24  Pharmacy Name: Joann Ambrocio LPN

## 2024-11-21 ENCOUNTER — HOSPITAL ENCOUNTER (OUTPATIENT)
Dept: WOMENS IMAGING | Age: 84
Discharge: HOME | End: 2024-11-21
Payer: MEDICARE

## 2024-11-21 DIAGNOSIS — Z12.31 VISIT FOR SCREENING MAMMOGRAM: ICD-10-CM

## 2024-11-21 PROCEDURE — 77063 BREAST TOMOSYNTHESIS BI: CPT

## 2024-12-10 ENCOUNTER — OFFICE VISIT (OUTPATIENT)
Dept: CARDIOLOGY CLINIC | Age: 84
End: 2024-12-10
Payer: MEDICARE

## 2024-12-10 VITALS
HEART RATE: 64 BPM | SYSTOLIC BLOOD PRESSURE: 162 MMHG | DIASTOLIC BLOOD PRESSURE: 60 MMHG | HEIGHT: 61 IN | BODY MASS INDEX: 48.52 KG/M2 | WEIGHT: 257 LBS

## 2024-12-10 DIAGNOSIS — I48.0 PAROXYSMAL ATRIAL FIBRILLATION (HCC): Primary | ICD-10-CM

## 2024-12-10 DIAGNOSIS — Z95.0 PACEMAKER: ICD-10-CM

## 2024-12-10 DIAGNOSIS — I10 PRIMARY HYPERTENSION: ICD-10-CM

## 2024-12-10 PROCEDURE — 1123F ACP DISCUSS/DSCN MKR DOCD: CPT | Performed by: PHYSICIAN ASSISTANT

## 2024-12-10 PROCEDURE — 1159F MED LIST DOCD IN RCRD: CPT | Performed by: PHYSICIAN ASSISTANT

## 2024-12-10 PROCEDURE — 99214 OFFICE O/P EST MOD 30 MIN: CPT | Performed by: PHYSICIAN ASSISTANT

## 2024-12-10 RX ORDER — LOSARTAN POTASSIUM 50 MG/1
50 TABLET ORAL 2 TIMES DAILY
Qty: 180 TABLET | Refills: 3 | Status: SHIPPED | OUTPATIENT
Start: 2024-12-10

## 2024-12-10 RX ORDER — CIPROFLOXACIN HYDROCHLORIDE 3.5 MG/ML
SOLUTION/ DROPS TOPICAL
COMMUNITY
Start: 2024-11-26

## 2024-12-10 NOTE — PROGRESS NOTES
[facility] [place] [facility]  [facility] CARDIOLOGY  [address]  [address] [place] [address]  Dept: [phone]  Dept Fax: [phone]  Loc: [phone]    Chief Complaint   Patient presents with    Follow-up   Office visit 10/8/2024  Patient with a history of pacemaker, coronary artery disease, paroxysmal atrial fibrillation presents with elevated blood pressure.  Patient states since increasing her losartan to 50 mg a day her blood pressure has been better controlled.  She states she has had at least 2 episodes of palpitations with shortness of breath and chest pressure that she feels is likely atrial fibrillation.  Cardiologist:  Dr. Landaverde  History of Present Illness  The patient is an 84-year-old female with a history of paroxysmal atrial fibrillation, presenting for follow-up after a recent episode.    She reports a single, brief episode of palpitations since her last visit. She experienced difficulty with the adhesive on her monitor, which resulted in skin irritation. She is not experiencing any chest pain or shortness of breath. She has observed bilateral ankle swelling at times.    Her blood pressure was recorded at 160 during her previous visit. She is currently on a regimen of losartan 50 mg    She is considering alternative medications to Eliquis due to cost concerns, as her insurance coverage has changed, increasing her out-of-pocket expense from $ 84 to $ 106 for a 90-day supply.    MEDICATIONS  Current: losartan, Eliquis  Past: Zetia       Cardiologist:  Dr. Landaverde        General:   No fever, no chills, No fatigue or weight loss  Pulmonary:    No dyspnea, no wheezing  Cardiac:   Rare palpitations   GI:     No nausea or vomiting, no abdominal pain  Neuro:    No dizziness or light headedness  Musculoskeletal:  No recent active issues  Extremities:  Occasional lower extremity edema        Past Medical History:   Diagnosis Date    Arthritis     CAD (coronary artery

## 2024-12-16 ENCOUNTER — OFFICE VISIT (OUTPATIENT)
Dept: NEUROLOGY | Age: 84
End: 2024-12-16
Payer: MEDICARE

## 2024-12-16 VITALS
OXYGEN SATURATION: 97 % | HEIGHT: 61 IN | DIASTOLIC BLOOD PRESSURE: 80 MMHG | SYSTOLIC BLOOD PRESSURE: 158 MMHG | BODY MASS INDEX: 29.53 KG/M2 | HEART RATE: 55 BPM | WEIGHT: 156.4 LBS

## 2024-12-16 DIAGNOSIS — R25.9 MIXED ACTION AND RESTING TREMOR: Primary | ICD-10-CM

## 2024-12-16 PROCEDURE — 99213 OFFICE O/P EST LOW 20 MIN: CPT | Performed by: PSYCHIATRY & NEUROLOGY

## 2024-12-16 PROCEDURE — 1123F ACP DISCUSS/DSCN MKR DOCD: CPT | Performed by: PSYCHIATRY & NEUROLOGY

## 2024-12-16 PROCEDURE — 1159F MED LIST DOCD IN RCRD: CPT | Performed by: PSYCHIATRY & NEUROLOGY

## 2024-12-16 NOTE — PROGRESS NOTES
NEUROLOGY OUT PATIENT FOLLOW UP NOTE:  12/16/20241:10 PM    Joanie Duke is here for follow up for mixed action and resting tremor. She is here to go over plan.        Allergies   Allergen Reactions    Latex Itching and Rash    Shellfish-Derived Products Nausea And Vomiting    Amiodarone      Severe headache, throat and tongue swell, and blisters on tongue    Anaprox [Naproxen Sodium]      Not sure of reaction    Avelox [Moxifloxacin Hcl In Nacl]      Confusion and delerium    Biaxin [Clarithromycin]     Bumex [Bumetanide] Other (See Comments)     AFFECTS THE KIDNEYS    Ciprofloxacin Hcl     Codeine      Not sure of reaction    Doxycycline      Dizzy cold sweat syncope    Dyazide [Hydrochlorothiazide W-Triamterene]      Near syncope    Fexofenadine      Not sure of reaction    Fluoxetine      shakes    Hydralazine Nausea Only and Other (See Comments)     RING IN MY EARS    Hyoscyamine      Dizziness and affected eyes    Indocin [Indometacin Sodium]      Not sure of reaction    Mometasone      Nervousness, severe headache, body aches    Morphine Nausea Only     tiredness    Norvasc [Amlodipine Besylate] Swelling     Feet, legs, hand    Omnicef      Vaginal yeast infection    Plavix [Clopidogrel Bisulfate]      bruises    Prednisone Swelling    Promethazine Nausea Only     Stomach cramp    Relafen [Nabumetone] Hives     dizziness    Statins Depletion Therapy      Muscle ache cramp    Triamterene     Adhesive Tape Rash    Amoxicillin Itching and Rash    Cipro Xr Nausea And Vomiting     Stomach pain    Darvocet [Propoxyphene N-Acetaminophen] Itching and Rash    Diflucan [Fluconazole] Nausea And Vomiting     Stomach cramp    Dye [Iodides] Nausea And Vomiting    Hydrocodone Nausea And Vomiting     redness    Niacin And Related Itching and Rash     blisters    Pcn [Penicillins] Rash    Sulfa Antibiotics Itching and Rash    Viroxyn [Benzalkonium Chloride] Nausea Only, Swelling and Rash    Zonalon [Doxepin Hcl] Rash

## 2025-01-12 RX ORDER — LABETALOL 200 MG/1
200 TABLET, FILM COATED ORAL 2 TIMES DAILY
Qty: 180 TABLET | Refills: 2 | Status: SHIPPED | OUTPATIENT
Start: 2025-01-12

## 2025-02-13 ENCOUNTER — TELEPHONE (OUTPATIENT)
Dept: CARDIOLOGY CLINIC | Age: 85
End: 2025-02-13

## 2025-02-13 NOTE — TELEPHONE ENCOUNTER
Spoke with bartolo and her Eliquis for $84 last year for a 90 day supply and this year it is $300.  Patient will stop by the office to  a 30 day free trial card and an application for patient assistance.      Told patient about Coumadin but she states she does not want to take that.

## 2025-02-13 NOTE — TELEPHONE ENCOUNTER
Patient lm on nurse line needs to talk to someone about her Eliquis.    Attempt to contact patient, no answer, [facility].

## 2025-02-25 ENCOUNTER — NURSE ONLY (OUTPATIENT)
Dept: CARDIOLOGY CLINIC | Age: 85
End: 2025-02-25

## 2025-02-25 DIAGNOSIS — I48.92 PAROXYSMAL ATRIAL FLUTTER (HCC): ICD-10-CM

## 2025-02-25 DIAGNOSIS — Z95.0 PACEMAKER: Primary | ICD-10-CM

## 2025-03-05 NOTE — TELEPHONE ENCOUNTER
Pt called in states she cant afford 346.00 for eliquis, so she wanted a 1 month supply sent to [facility] as she already wrote them a check, and she is going to call [facility] and  get an account set up, and we will fax a new script to them.

## 2025-03-10 DIAGNOSIS — I48.92 PAROXYSMAL ATRIAL FLUTTER (HCC): ICD-10-CM

## 2025-03-10 RX ORDER — EZETIMIBE 10 MG/1
10 TABLET ORAL NIGHTLY
Qty: 90 TABLET | Refills: 0 | Status: SHIPPED | OUTPATIENT
Start: 2025-03-10

## 2025-03-10 NOTE — TELEPHONE ENCOUNTER
Spoke to patient, she set up an account with the [facility].  Rx printed in separate refill encounter and out for signature. To be faxed.

## 2025-03-10 NOTE — TELEPHONE ENCOUNTER
The Pt called in for a medication refill of Evetimbe 10 Mg Tablets. However, She was out of Refills.  I transferred her Ph. Call to the Office Staff for further assitance.

## 2025-03-19 DIAGNOSIS — R25.9 MIXED ACTION AND RESTING TREMOR: ICD-10-CM

## 2025-03-19 RX ORDER — ROPINIROLE 0.25 MG/1
0.25 TABLET, FILM COATED ORAL 2 TIMES DAILY
Qty: 180 TABLET | Refills: 1 | Status: SHIPPED | OUTPATIENT
Start: 2025-03-19

## 2025-03-19 NOTE — TELEPHONE ENCOUNTER
[facility] pharmacy -   [facility].     Please approve or deny     Last Visit Date:  12/16/2024   Dr. Goodrich    Next Visit Date:    8/15/2025 Paige Leopold CNP

## 2025-03-20 RX ORDER — EZETIMIBE 10 MG/1
10 TABLET ORAL NIGHTLY
Qty: 90 TABLET | Refills: 3 | Status: SHIPPED | OUTPATIENT
Start: 2025-03-20

## 2025-03-24 NOTE — TELEPHONE ENCOUNTER
Pt called and LM on nurse line stating something about having 2 different doses of Losartan.   Last OV note from 12/10/2024:  The dosage of losartan will be increased to 50 mg, to be taken twice daily.     LM for patient to call our office back.

## 2025-03-25 RX ORDER — LOSARTAN POTASSIUM 50 MG/1
50 TABLET ORAL 2 TIMES DAILY
Qty: 180 TABLET | Refills: 3 | Status: SHIPPED | OUTPATIENT
Start: 2025-03-25

## 2025-03-25 NOTE — TELEPHONE ENCOUNTER
Spoke patient.  She needed a refill on Losartan.  She was taking the correct dose but Dr Lindquist had 100 mg, therefore, prefer that we fill for him.

## 2025-04-15 ENCOUNTER — OFFICE VISIT (OUTPATIENT)
Dept: CARDIOLOGY CLINIC | Age: 85
End: 2025-04-15
Payer: MEDICARE

## 2025-04-15 VITALS
DIASTOLIC BLOOD PRESSURE: 74 MMHG | WEIGHT: 153 LBS | HEART RATE: 72 BPM | BODY MASS INDEX: 28.89 KG/M2 | SYSTOLIC BLOOD PRESSURE: 142 MMHG | HEIGHT: 61 IN

## 2025-04-15 DIAGNOSIS — I48.0 PAROXYSMAL ATRIAL FIBRILLATION (HCC): ICD-10-CM

## 2025-04-15 DIAGNOSIS — I10 PRIMARY HYPERTENSION: ICD-10-CM

## 2025-04-15 DIAGNOSIS — Z95.0 PACEMAKER: Primary | ICD-10-CM

## 2025-04-15 DIAGNOSIS — E78.01 FAMILIAL HYPERCHOLESTEROLEMIA: ICD-10-CM

## 2025-04-15 PROCEDURE — 1159F MED LIST DOCD IN RCRD: CPT | Performed by: NUCLEAR MEDICINE

## 2025-04-15 PROCEDURE — 99214 OFFICE O/P EST MOD 30 MIN: CPT | Performed by: NUCLEAR MEDICINE

## 2025-04-15 PROCEDURE — 1123F ACP DISCUSS/DSCN MKR DOCD: CPT | Performed by: NUCLEAR MEDICINE

## 2025-04-15 NOTE — PROGRESS NOTES
Patient here for follow up.  Patient states she thought her labetalol was decreased to 100 mg BID?

## 2025-04-15 NOTE — PROGRESS NOTES
[facility] [place] [facility]  [facility] CARDIOLOGY  [address]  [address]  [facility] [address]  Dept: [phone]  Dept Fax: [phone]  Loc: [phone]    Visit Date: 4/15/2025    Joanie Duke is a 84 y.o. female who presents todayfor:  Chief Complaint   Patient presents with    Follow-up    Atrial Fibrillation    Hypertension    Hyperlipidemia     Known pacer and A fib   A fib seems stable  No recent episodes  Some palpitation   No bleeding with eliquis   BP is stable  No dizziness  No syncope  No chest pain reported   No changes in breathing  Hyperlipidemia on Zetia  Issues with the statins   Diet controled as well       HPI:  HPI  Past Medical History:   Diagnosis Date    Arthritis     CAD (coronary artery disease)     Hyperlipidemia     Hypertension     Ovarian cyst     right      Past Surgical History:   Procedure Laterality Date    CARDIAC CATHETERIZATION  2007    CHOLECYSTECTOMY  1986    COLONOSCOPY      last one     DILATION AND CURETTAGE OF UTERUS  1980    FOOT SURGERY Right  & 2005    x2    FOOT SURGERY Left 2015    Osteotomy, Tendon Transfer    HYSTERECTOMY (CERVIX STATUS UNKNOWN)  1981    OVARY REMOVAL Bilateral 2013    PACEMAKER PLACEMENT  2004    PACER INTERROGATE  2024    PARTIAL HYSTERECTOMY (CERVIX NOT REMOVED)  1981    still has ovaries    SEPTOPLASTY  2011    Dr. Lopez    SINUS SURGERY      dr lpoez 9    TONSILLECTOMY      as a child    TURBINOPLASTIES  2011    Dr. Lopez     Family History   Problem Relation Age of Onset    Early Death Mother 37         of pneumonia and pleurisy    Arthritis Father     Diabetes Father     Heart Disease Sister     Diabetes Brother     Diabetes Maternal Aunt     Arthritis Paternal Grandmother     Heart Disease Paternal Grandmother         murmur    Diabetes Paternal Grandfather     Diabetes Other     Hypertension Other     Heart Failure Other

## 2025-06-02 RX ORDER — LOSARTAN POTASSIUM 50 MG/1
50 TABLET ORAL 2 TIMES DAILY
Qty: 180 TABLET | Refills: 3 | Status: SHIPPED | OUTPATIENT
Start: 2025-06-02

## 2025-06-23 DIAGNOSIS — R25.9 MIXED ACTION AND RESTING TREMOR: ICD-10-CM

## 2025-06-23 RX ORDER — APIXABAN 2.5 MG/1
2.5 TABLET, FILM COATED ORAL 2 TIMES DAILY
Qty: 180 TABLET | Refills: 3 | Status: SHIPPED | OUTPATIENT
Start: 2025-06-23

## 2025-06-24 RX ORDER — ROPINIROLE 0.25 MG/1
TABLET, FILM COATED ORAL
Qty: 180 TABLET | Refills: 1 | Status: SHIPPED | OUTPATIENT
Start: 2025-06-24

## 2025-06-24 NOTE — TELEPHONE ENCOUNTER
We have received a refill request on the following medications:  Requested Prescriptions     Pending Prescriptions Disp Refills    rOPINIRole (REQUIP) 0.25 MG tablet [Pharmacy Med Name: Ropinirole Hcl Tab 0.25 Mg (Alembic] 180 tablet 1     Sig: TAKE ONE TABLET BY MOUTH TWICE A DAY (9AM AND 5PM)       Date last time medication prescribed: 3/19/25    Last Visit Date:  12/16/2024 with Lali Goodrich MD    Next Visit Date:    8/15/2025 with Paige Leopold, CNP    Please approve or deny.

## 2025-08-15 ENCOUNTER — OFFICE VISIT (OUTPATIENT)
Dept: NEUROLOGY | Age: 85
End: 2025-08-15
Payer: MEDICARE

## 2025-08-15 VITALS
HEART RATE: 60 BPM | OXYGEN SATURATION: 96 % | WEIGHT: 148 LBS | DIASTOLIC BLOOD PRESSURE: 60 MMHG | HEIGHT: 61 IN | SYSTOLIC BLOOD PRESSURE: 148 MMHG | BODY MASS INDEX: 27.94 KG/M2

## 2025-08-15 DIAGNOSIS — R25.9 MIXED ACTION AND RESTING TREMOR: Primary | ICD-10-CM

## 2025-08-15 PROCEDURE — 99213 OFFICE O/P EST LOW 20 MIN: CPT | Performed by: NURSE PRACTITIONER

## 2025-08-15 PROCEDURE — 1123F ACP DISCUSS/DSCN MKR DOCD: CPT | Performed by: NURSE PRACTITIONER

## 2025-08-15 PROCEDURE — 1159F MED LIST DOCD IN RCRD: CPT | Performed by: NURSE PRACTITIONER

## 2025-09-02 ENCOUNTER — TELEPHONE (OUTPATIENT)
Dept: CARDIOLOGY CLINIC | Age: 85
End: 2025-09-02

## 2025-09-02 ENCOUNTER — CLINICAL SUPPORT (OUTPATIENT)
Dept: CARDIOLOGY CLINIC | Age: 85
End: 2025-09-02

## 2025-09-02 DIAGNOSIS — I47.20 VT (VENTRICULAR TACHYCARDIA) (HCC): Primary | ICD-10-CM

## 2025-09-02 DIAGNOSIS — Z95.0 PACEMAKER: Primary | ICD-10-CM

## 2025-09-05 ENCOUNTER — LAB (OUTPATIENT)
Dept: LAB | Age: 85
End: 2025-09-05

## 2025-09-05 DIAGNOSIS — I47.20 VT (VENTRICULAR TACHYCARDIA) (HCC): ICD-10-CM

## 2025-09-05 LAB
ANION GAP SERPL CALC-SCNC: 10 MEQ/L (ref 8–16)
BUN SERPL-MCNC: 31 MG/DL (ref 8–23)
CALCIUM SERPL-MCNC: 9.4 MG/DL (ref 8.5–10.5)
CHLORIDE SERPL-SCNC: 106 MEQ/L (ref 98–111)
CO2 SERPL-SCNC: 25 MEQ/L (ref 22–29)
CREAT SERPL-MCNC: 1.5 MG/DL (ref 0.5–0.9)
GFR SERPL CREATININE-BSD FRML MDRD: 34 ML/MIN/1.73M2
GLUCOSE SERPL-MCNC: 90 MG/DL (ref 74–109)
MAGNESIUM SERPL-MCNC: 2.2 MG/DL (ref 1.6–2.6)
POTASSIUM SERPL-SCNC: 4.3 MEQ/L (ref 3.5–5.2)
SODIUM SERPL-SCNC: 141 MEQ/L (ref 135–145)